# Patient Record
Sex: FEMALE | Race: BLACK OR AFRICAN AMERICAN | Employment: FULL TIME | URBAN - METROPOLITAN AREA
[De-identification: names, ages, dates, MRNs, and addresses within clinical notes are randomized per-mention and may not be internally consistent; named-entity substitution may affect disease eponyms.]

---

## 2020-08-11 ENCOUNTER — OFFICE VISIT (OUTPATIENT)
Dept: OBGYN CLINIC | Age: 40
End: 2020-08-11
Payer: COMMERCIAL

## 2020-08-11 VITALS
HEIGHT: 66 IN | BODY MASS INDEX: 36.54 KG/M2 | SYSTOLIC BLOOD PRESSURE: 132 MMHG | WEIGHT: 227.38 LBS | DIASTOLIC BLOOD PRESSURE: 94 MMHG

## 2020-08-11 DIAGNOSIS — Z01.419 ROUTINE GYNECOLOGICAL EXAMINATION: Primary | ICD-10-CM

## 2020-08-11 DIAGNOSIS — Z12.4 SCREENING FOR MALIGNANT NEOPLASM OF THE CERVIX: ICD-10-CM

## 2020-08-11 PROCEDURE — 99395 PREV VISIT EST AGE 18-39: CPT | Performed by: OBSTETRICS & GYNECOLOGY

## 2020-08-11 NOTE — PROGRESS NOTES
Adiel Sanabria is a 47 y.o. female who presents today for the following:  Chief Complaint   Patient presents with    Annual Exam     needs Pap smear        No Known Allergies    Current Outpatient Medications   Medication Sig    Diclofenac Potassium (CAMBIA) 50 mg pwpk Take 50 mg by mouth as needed.  topiramate (TOPAMAX) 50 mg tablet Take 50 mg by mouth two (2) times a day.  nortriptyline (PAMELOR) 25 mg capsule Take  by mouth nightly.  promethazine (PHENERGAN) 25 mg suppository Insert  into rectum every six (6) hours as needed for Nausea. No current facility-administered medications for this visit. Past Medical History:   Diagnosis Date    Headache        Past Surgical History:   Procedure Laterality Date    LAP,TUBAL CAUTERY  2011       Family History   Problem Relation Age of Onset   [facility] Migraines Mother     Migraines Sister        Social History     Socioeconomic History    Marital status: SINGLE     Spouse name: Not on file    Number of children: Not on file    Years of education: Not on file    Highest education level: Not on file   Occupational History    Not on file   Social Needs    Financial resource strain: Not on file    Food insecurity     Worry: Not on file     Inability: Not on file    Transportation needs     Medical: Not on file     Non-medical: Not on file   Tobacco Use    Smoking status: Current Every Day Smoker     Packs/day: 1.00    Smokeless tobacco: Never Used   Substance and Sexual Activity    Alcohol use:  Yes    Drug use: Never    Sexual activity: Yes   Lifestyle    Physical activity     Days per week: Not on file     Minutes per session: Not on file    Stress: Not on file   Relationships    Social connections     Talks on phone: Not on file     Gets together: Not on file     Attends Caodaism service: Not on file     Active member of club or organization: Not on file     Attends meetings of clubs or organizations: Not on file     Relationship status: Not on file    Intimate partner violence     Fear of current or ex partner: Not on file     Emotionally abused: Not on file     Physically abused: Not on file     Forced sexual activity: Not on file   Other Topics Concern    Not on file   Social History Narrative    Not on file         HPI  Here for annual exam.  Patient is doing well. ROS   Review of Systems   Constitutional: Negative. HENT: Negative. Eyes: Negative. Respiratory: Negative. Cardiovascular: Negative. Gastrointestinal: Negative. Genitourinary: Negative. Musculoskeletal: Negative. Skin: Negative. Neurological: Negative. Endo/Heme/Allergies: Negative. Psychiatric/Behavioral: Negative. BP (!) 132/94 (BP 1 Location: Left arm, BP Patient Position: Sitting)   Ht 5' 6\" (1.676 m)   Wt 227 lb 6 oz (103.1 kg)   LMP 07/09/2020   BMI 36.70 kg/m²    OBGyn Exam   Constitutional  · Appearance: well-nourished, well developed, alert, in no acute distress    HENT  · Head and Face: appears normal    Neck  · Inspection/Palpation: normal appearance, no masses or tenderness  · Lymph Nodes: no lymphadenopathy present  · Thyroid: gland size normal, nontender, no nodules or masses present on palpation    Breasts   Symmetric, no palpable masses, no tenderness, no skin changes, no nipple abnormality, no nipple discharge, no lymphadenopathy.     Chest  · Respiratory Effort: breathing labored  · Auscultation: normal breath sounds    Cardiovascular  · Heart:  · Auscultation: regular rate and rhythm without murmur    Gastrointestinal  · Abdominal Examination: abdomen non-tender to palpation, normal bowel sounds, no masses present  · Liver and spleen: no hepatomegaly present, spleen not palpable  · Hernias: no hernias identified    Genitourinary  · External Genitalia: normal appearance for age, no discharge present, no tenderness present, no inflammatory lesions present, no masses present, no atrophy present  · Vagina: normal vaginal vault without central or paravaginal defects, no discharge present, no inflammatory lesions present, no masses present  · Bladder: non-tender to palpation  · Urethra: appears normal  · Cervix: normal   · Uterus: normal size, shape and consistency  · Adnexa: no adnexal tenderness present, no adnexal masses present  · Perineum: perineum within normal limits, no evidence of trauma, no rashes or skin lesions present  · Anus: anus within normal limits, no hemorrhoids present  · Inguinal Lymph Nodes: no lymphadenopathy present    Skin  · General Inspection: no rash, no lesions identified    Neurologic/Psychiatric  · Mental Status:  · Orientation: grossly oriented to person, place and time  · Mood and Affect: mood normal, affect appropriate    No results found for this visit on 08/11/20. Orders Placed This Encounter    PAP IG, CT-NG TV [address] HPV [id number], [id number])     Order Specific Question:   Pap Source? Answer:   Endocervical     Order Specific Question:   Total Hysterectomy? Answer:   No     Order Specific Question:   Supracervical Hysterectomy? Answer:   No     Order Specific Question:   Post Menopausal?     Answer:   No     Order Specific Question:   Hormone Therapy? Answer:   No     Order Specific Question:   IUD? Answer:   No     Order Specific Question:   Abnormal Bleeding? Answer:   No     Order Specific Question:   Pregnant     Answer:   No     Order Specific Question:   Post Partum? Answer:   No         1. Routine gynecological examination  Reviewed Pap smear/HPV, mammogram, bone density colonoscopy testing guidelines. Encouraged healthy lifestyle. Discussed importanceof getting annual exams. Discussed the risk of osteoporosis and recommended 1200 to 1500 mg of calcium as well as vitamin D supplementation daily.   Recommended monthly self breast exams and instructed and demonstrated to the patient on the proper technique educated on the importance of healthy weight management and the significance of not smoking.     2. Screening for malignant neoplasm of the cervix    - PAP IG, CT-NG TV [address] HPV LGBF([id number], [id number])        Follow-up and Dispositions    · Return in about 1 year (around 8/11/2021) for Annual Exam.

## 2020-08-15 LAB
C TRACH RRNA CVX QL NAA+PROBE: NEGATIVE
CYTOLOGIST CVX/VAG CYTO: NORMAL
CYTOLOGY CVX/VAG DOC CYTO: NORMAL
CYTOLOGY CVX/VAG DOC THIN PREP: NORMAL
DX ICD CODE: NORMAL
LABCORP, 190119: NORMAL
Lab: NORMAL
N GONORRHOEA RRNA CVX QL NAA+PROBE: NEGATIVE
OTHER STN SPEC: NORMAL
STAT OF ADQ CVX/VAG CYTO-IMP: NORMAL
T VAGINALIS RRNA SPEC QL NAA+PROBE: NEGATIVE

## 2020-08-20 ENCOUNTER — TELEPHONE (OUTPATIENT)
Dept: OBGYN CLINIC | Age: 40
End: 2020-08-20

## 2020-08-20 NOTE — TELEPHONE ENCOUNTER
----- Message from Brook Jimenez MD sent at 8/17/2020  1:32 PM EDT -----  Please notify patient about normal results.   Thank you

## 2020-09-29 ENCOUNTER — OFFICE VISIT (OUTPATIENT)
Dept: NEUROLOGY | Age: 40
End: 2020-09-29
Payer: COMMERCIAL

## 2020-09-29 VITALS
BODY MASS INDEX: 36.98 KG/M2 | DIASTOLIC BLOOD PRESSURE: 68 MMHG | RESPIRATION RATE: 18 BRPM | WEIGHT: 230.1 LBS | SYSTOLIC BLOOD PRESSURE: 114 MMHG | HEIGHT: 66 IN | OXYGEN SATURATION: 98 % | HEART RATE: 76 BPM | TEMPERATURE: 97 F

## 2020-09-29 DIAGNOSIS — G43.009 MIGRAINE WITHOUT AURA AND WITHOUT STATUS MIGRAINOSUS, NOT INTRACTABLE: Primary | ICD-10-CM

## 2020-09-29 DIAGNOSIS — E66.01 SEVERE OBESITY (HCC): ICD-10-CM

## 2020-09-29 DIAGNOSIS — R06.83 HABITUAL SNORING: ICD-10-CM

## 2020-09-29 PROCEDURE — 99204 OFFICE O/P NEW MOD 45 MIN: CPT | Performed by: SPECIALIST

## 2020-09-29 RX ORDER — RIZATRIPTAN BENZOATE 10 MG/1
10 TABLET ORAL
Qty: 9 TAB | Refills: 3 | Status: SHIPPED | OUTPATIENT
Start: 2020-09-29 | End: 2020-09-29

## 2020-09-29 RX ORDER — ERENUMAB-AOOE 70 MG/ML
70 INJECTION SUBCUTANEOUS ONCE
Qty: 1 EACH | Refills: 5 | Status: SHIPPED | OUTPATIENT
Start: 2020-09-29 | End: 2020-09-29

## 2020-09-29 NOTE — PROGRESS NOTES
Neurology Consult      Subjective:      Jennifer Phelan is a 47 y.o. female who comes in today with the following headache history. Says she was diagnosed with migraines in 2011. Gets 20 out of 31 headache days per month. These are bad headaches when they begin on awakening. They can be right occipital to frontal and with her monthly cycles they are strictly occipital base. As soon as her cycle start her headache stops. Headaches have a throbbing character with nausea and occasional vomiting. Enhanced by aerosol plug-in's and caffeine. Diminished by applying some pressure on her neck and a heating pad on the back of her head and a call wrap on the front. Sleeping is good although her  says she habitually snores. Denies any stress per se. She says she needs to develop a regular exercise program.  Previous information includes seeing my partner(Shu) in October 2016. At that time was getting 2 headaches a week intense and it seemed to other neurologist.  Found that Percocet and Motrin helped but Imitrex did not. Topamax would induce paresthesia and taste changes and nortriptyline she had stopped because of failure. Had a previous brain MRI reported normal and obesity was in the picture and her headaches had a positive monthly cycle association. Current Outpatient Medications   Medication Sig Dispense Refill    butalb/acetaminophen/caffeine (FIORICET PO) Take  by mouth.  erenumab-aooe (Aimovig Autoinjector) 70 mg/mL injection 1 mL by SubCUTAneous route once for 1 dose. 1 Each 5    Diclofenac Potassium (CAMBIA) 50 mg pwpk Take 50 mg by mouth as needed. 1 Packet 5    topiramate (TOPAMAX) 50 mg tablet Take 50 mg by mouth two (2) times a day. 1    nortriptyline (PAMELOR) 25 mg capsule Take  by mouth nightly.  promethazine (PHENERGAN) 25 mg suppository Insert  into rectum every six (6) hours as needed for Nausea.         No Known Allergies  Past Medical History:   Diagnosis Date  Headache       Past Surgical History:   Procedure Laterality Date    HX GYN      LAP,TUBAL CAUTERY        Social History     Socioeconomic History    Marital status: SINGLE     Spouse name: Not on file    Number of children: Not on file    Years of education: Not on file    Highest education level: Not on file   Occupational History    Not on file   Social Needs    Financial resource strain: Not on file    Food insecurity     Worry: Not on file     Inability: Not on file    Transportation needs     Medical: Not on file     Non-medical: Not on file   Tobacco Use    Smoking status: Former Smoker     Packs/day: 1.00     Last attempt to quit: 2019     Years since quittin.7    Smokeless tobacco: Never Used   Substance and Sexual Activity    Alcohol use: Yes    Drug use: Never    Sexual activity: Yes   Lifestyle    Physical activity     Days per week: Not on file     Minutes per session: Not on file    Stress: Not on file   Relationships    Social connections     Talks on phone: Not on file     Gets together: Not on file     Attends Latter day service: Not on file     Active member of club or organization: Not on file     Attends meetings of clubs or organizations: Not on file     Relationship status: Not on file    Intimate partner violence     Fear of current or ex partner: Not on file     Emotionally abused: Not on file     Physically abused: Not on file     Forced sexual activity: Not on file   Other Topics Concern    Not on file   Social History Narrative    Not on file      Family History   Problem Relation Age of Onset    Migraines Mother     Migraines Sister       Visit Vitals  /68   Pulse 76   Temp 97 °F (36.1 °C)   Resp 18   Ht 5' 6\" (1.676 m)   Wt 104.4 kg (230 lb 1.6 oz)   LMP 2020 (Exact Date)   SpO2 98%   BMI 37.14 kg/m²        Review of Systems:   A comprehensive review of systems was negative except for that written in the HPI.     Neuro Exam:     Appearance: The patient is well developed, well nourished, provides a coherent history and is in no acute distress. Mental Status: Oriented to time, place and person. Mood and affect appropriate. Cranial Nerves:   Intact visual fields. Fundi are benign. SIVA, EOM's full, no nystagmus, no ptosis. Facial sensation is normal. Corneal reflexes are intact. Facial movement is symmetric. Hearing is normal bilaterally. Palate is midline with normal sternocleidomastoid and trapezius muscles are normal. Tongue is midline. Motor:  5/5 strength in upper and lower proximal and distal muscles. Normal bulk and tone. No fasciculations. Reflexes:   Deep tendon reflexes 2+/4 and symmetrical.   Sensory:   Normal to touch, pinprick and vibration. Gait:  Normal gait. Tremor:   No tremor noted. Cerebellar:  No cerebellar signs present. Neurovascular:  Normal heart sounds and regular rhythm, peripheral pulses intact, and no carotid bruits. Assessment:   Migraine headaches. Given the patient's characteristics will suggest Lewkeith Nanas as a preventative and Maxalt as a rescue which can be used at moment 0 with over-the-counter remedies. Will refer to sleep medicine for concerns of obstructive sleep apnea. Get good sleep minimize stress and regular exercise could help. Continue to monitor headaches to increase self-awareness. Plan:   Revisit 6 weeks.   Signed by :  Irineo Mirza MD

## 2020-09-29 NOTE — PATIENT INSTRUCTIONS
[address] [facility]   Statement to Patients  April 1, 2014      In an effort to ensure the large volume of patient prescription refills is processed in the most efficient and expeditious manner, we are asking our patients to assist us by calling your Pharmacy for all prescription refills, this will include also your  Mail Order Pharmacy. The pharmacy will contact our office electronically to continue the refill process. Please do not wait until the last minute to call your pharmacy. We need at least 48 hours (2days) to fill prescriptions. We also encourage you to call your pharmacy before going to  your prescription to make sure it is ready. With regard to controlled substance prescription refill requests (narcotic refills) that need to be picked up at our office, we ask your cooperation by providing us with at least 72 hours (3days) notice that you will need a refill. We will not refill narcotic prescription refill requests after 4:00pm on any weekday, Monday through Thursday, or after 2:00pm on Fridays, or on the weekends. We encourage everyone to explore another way of getting your prescription refill request processed using [employer], our patient web portal through our electronic medical record system. [employer] is an efficient and effective way to communicate your medication request directly to the office and  downloadable as an ford on your smart phone . [employer] also features a review functionality that allows you to view your medication list as well as leave messages for your physician. Are you ready to get connected? If so please review the attatched instructions or speak to any of our staff to get you set up right away! Thank you so much for your cooperation. Should you have any questions please contact our Practice Administrator. The Physicians and Staff,  [facility]   Patient history reviewed patient examined.   With the patient's headache descriptors will suggest a preventative drug with Aimovig and a rescue drug with Maxalt. The Maxalt can be taken at moment 0 with over-the-counter Aleve or ibuprofen for potentially a better combination effect. Does have a regular snoring issue so will refer to sleep medicine for rule out obstructive sleep apnea. Good good sleep minimize stress and a regular exercise routine such as walking could help. Continue to monitor headaches to increase self-awareness.

## 2020-11-19 ENCOUNTER — OFFICE VISIT (OUTPATIENT)
Dept: NEUROLOGY | Age: 40
End: 2020-11-19
Payer: COMMERCIAL

## 2020-11-19 VITALS
HEIGHT: 66 IN | OXYGEN SATURATION: 97 % | SYSTOLIC BLOOD PRESSURE: 134 MMHG | BODY MASS INDEX: 37.14 KG/M2 | DIASTOLIC BLOOD PRESSURE: 82 MMHG | HEART RATE: 71 BPM | RESPIRATION RATE: 18 BRPM

## 2020-11-19 DIAGNOSIS — G43.009 MIGRAINE WITHOUT AURA AND WITHOUT STATUS MIGRAINOSUS, NOT INTRACTABLE: Primary | ICD-10-CM

## 2020-11-19 PROCEDURE — 99213 OFFICE O/P EST LOW 20 MIN: CPT | Performed by: SPECIALIST

## 2020-11-19 NOTE — PATIENT INSTRUCTIONS
Patient history reviewed patient examined. Very happy the patient is done so well with headache control and I hope it continues. Did not need refills on the Aimovig or the Maxalt today. I did suggest on the perimenstrual headaches that magnesium and riboflavin supplements that can be purchased in the pharmacy may help tamper those headaches down. Good good sleep minimize stress and exercise could be helpful as well. Revisit 6 months.

## 2020-11-19 NOTE — PROGRESS NOTES
Neurology Consult      Subjective:      Hilary Rousseau is a 47 y.o. female who comes in today with migraine headache history. Wears a big smile and says she cannot believe her headache control at this point. Got 6 headaches last month and this month only 3. Is on Aimovig 70 mg and the Maxalt as a rescue remedy. Gives herself her own injections and has had no difficulties. Did reference some headaches that she can normally count on around her cycle. My suggestion was to check out the pharmacy and could get over-the-counter magnesium and riboflavin supplements that may help with those headache types. She references no new problems and as if the headache success was not enough there is no downside to taking the Aimovig. Simple advice would be to get good sleep minimize stress and a regular exercise program could help as well. Revisit 6 months. Current Outpatient Medications   Medication Sig Dispense Refill    erenumab-aooe (AIMOVIG AUTOINJECTOR SC) by SubCUTAneous route.  butalb/acetaminophen/caffeine (FIORICET PO) Take  by mouth.  Diclofenac Potassium (CAMBIA) 50 mg pwpk Take 50 mg by mouth as needed. 1 Packet 5    topiramate (TOPAMAX) 50 mg tablet Take 50 mg by mouth two (2) times a day. 1    nortriptyline (PAMELOR) 25 mg capsule Take  by mouth nightly.  promethazine (PHENERGAN) 25 mg suppository Insert  into rectum every six (6) hours as needed for Nausea.         No Known Allergies  Past Medical History:   Diagnosis Date    Headache       Past Surgical History:   Procedure Laterality Date    HX GYN      LAP,TUBAL CAUTERY  2011      Social History     Socioeconomic History    Marital status: SINGLE     Spouse name: Not on file    Number of children: Not on file    Years of education: Not on file    Highest education level: Not on file   Occupational History    Not on file   Social Needs    Financial resource strain: Not on file    Food insecurity     Worry: Not on file Inability: Not on file    Transportation needs     Medical: Not on file     Non-medical: Not on file   Tobacco Use    Smoking status: Former Smoker     Packs/day: 1.00     Last attempt to quit: 2019     Years since quittin.8    Smokeless tobacco: Never Used   Substance and Sexual Activity    Alcohol use: Yes    Drug use: Never    Sexual activity: Yes   Lifestyle    Physical activity     Days per week: Not on file     Minutes per session: Not on file    Stress: Not on file   Relationships    Social connections     Talks on phone: Not on file     Gets together: Not on file     Attends Lutheran service: Not on file     Active member of club or organization: Not on file     Attends meetings of clubs or organizations: Not on file     Relationship status: Not on file    Intimate partner violence     Fear of current or ex partner: Not on file     Emotionally abused: Not on file     Physically abused: Not on file     Forced sexual activity: Not on file   Other Topics Concern    Not on file   Social History Narrative    Not on file      Family History   Problem Relation Age of Onset    Migraines Mother     Migraines Sister       Visit Vitals  /82   Pulse 71   Resp 18   Ht 5' 6\" (1.676 m)   LMP 10/17/2020 (Exact Date)   SpO2 97%   BMI 37.14 kg/m²        Review of Systems:   A comprehensive review of systems was negative except for that written in the HPI. Neuro Exam:     Appearance: The patient is well developed, well nourished, provides a coherent history and is in no acute distress. Mental Status: Oriented to time, place and person. Mood and affect appropriate. Cranial Nerves:   Intact visual fields. Fundi are benign. SIVA, EOM's full, no nystagmus, no ptosis. Facial sensation is normal. Corneal reflexes are intact. Facial movement is symmetric. Hearing is normal bilaterally. Palate is midline with normal sternocleidomastoid and trapezius muscles are normal. Tongue is midline.    Motor: 5/5 strength in upper and lower proximal and distal muscles. Normal bulk and tone. No fasciculations. Reflexes:   Deep tendon reflexes 2+/4 and symmetrical.   Sensory:   Normal to touch, pinprick and vibration. Gait:  Normal gait. Tremor:   No tremor noted. Cerebellar:  No cerebellar signs present. Neurovascular:  Normal heart sounds and regular rhythm, peripheral pulses intact, and no carotid bruits. Assessment:   Migraine headaches. Very happy for patient and hope the success continues of course. Did reference magnesium and riboflavin supplements which can be purchased over-the-counter in pharmacy and perhaps that enhances her headache control even more around her monthly cycles. Get good sleep minimize stress and exercise could help. Plan:   Revisit 6 months.   Signed by :  Juan Diego Butler MD

## 2021-01-12 ENCOUNTER — TELEPHONE (OUTPATIENT)
Dept: NEUROLOGY | Age: 41
End: 2021-01-12

## 2021-01-12 NOTE — TELEPHONE ENCOUNTER
----- Message from Rochele Cabot sent at 1/12/2021 11:37 AM EST -----  Regarding: UNIQUE/TELEPHONE  Contact: [phone]  General Message/Vendor Calls    Caller's first and last name: Shadia Hartman      Reason for call: Prior auth needed for Aimovig      Callback required yes/no and why: Yes      Best contact number(s): [phone]      Details to clarify the request: Per patient a prior Gaviota Couch is needed for Aimovig, Patient stated she has been out of the injection for 3 days. Please call prior auth in at [phone].       Rochele Cabot

## 2021-01-13 NOTE — TELEPHONE ENCOUNTER
----- Message from Panda Marsh sent at 1/13/2021 12:49 PM EST -----  Regarding: Dr. Adeola Alberto: [phone]  General Message/Vendor Calls    Caller's first and last name: Patient      Reason for call: Patient's insurance, [employer],  needs a prior authorization form ([phone]) filled out and resent to her insurance so her pharmacy, [phone], can refill the \"amovic shot\" medication. Callback required yes/no and why: Yes, confirm prior authorization form is sent.        Best contact number(s):[phone]      Details to clarify the request:n/a      Panda Marsh

## 2021-01-29 DIAGNOSIS — B00.9 HSV (HERPES SIMPLEX VIRUS) INFECTION: Primary | ICD-10-CM

## 2021-01-29 RX ORDER — VALACYCLOVIR HYDROCHLORIDE 500 MG/1
500 TABLET, FILM COATED ORAL 2 TIMES DAILY
Qty: 10 TAB | Refills: 1 | Status: SHIPPED | OUTPATIENT
Start: 2021-01-29 | End: 2021-03-04 | Stop reason: SDUPTHER

## 2021-03-04 ENCOUNTER — TELEPHONE (OUTPATIENT)
Dept: OBGYN CLINIC | Age: 41
End: 2021-03-04

## 2021-03-04 DIAGNOSIS — B00.9 HSV (HERPES SIMPLEX VIRUS) INFECTION: ICD-10-CM

## 2021-03-04 RX ORDER — VALACYCLOVIR HYDROCHLORIDE 500 MG/1
500 TABLET, FILM COATED ORAL 2 TIMES DAILY
Qty: 10 TAB | Refills: 1 | Status: SHIPPED | OUTPATIENT
Start: 2021-03-04 | End: 2022-06-15 | Stop reason: SDUPTHER

## 2021-03-07 ENCOUNTER — HOSPITAL ENCOUNTER (EMERGENCY)
Age: 41
Discharge: HOME | End: 2021-03-07
Attending: FAMILY MEDICINE
Payer: COMMERCIAL

## 2021-03-07 VITALS
RESPIRATION RATE: 16 BRPM | WEIGHT: 230 LBS | BODY MASS INDEX: 36.96 KG/M2 | HEIGHT: 66 IN | DIASTOLIC BLOOD PRESSURE: 99 MMHG | SYSTOLIC BLOOD PRESSURE: 143 MMHG | HEART RATE: 76 BPM | TEMPERATURE: 98.4 F | OXYGEN SATURATION: 99 %

## 2021-03-07 DIAGNOSIS — B34.9 VIRAL ILLNESS: Primary | ICD-10-CM

## 2021-03-07 LAB
FLUAV AG NPH QL IA: NEGATIVE
FLUBV AG NOSE QL IA: NEGATIVE

## 2021-03-07 PROCEDURE — 99282 EMERGENCY DEPT VISIT SF MDM: CPT

## 2021-03-07 PROCEDURE — 87804 INFLUENZA ASSAY W/OPTIC: CPT

## 2021-03-07 NOTE — ED PROVIDER NOTES
EMERGENCY DEPARTMENT HISTORY AND PHYSICAL EXAM      Date: 3/7/2021  Patient Name: Akshat Lozano    History of Presenting Illness     Chief Complaint   Patient presents with    Chills    Generalized Body Aches       History Provided By: Patient    HPI: Akshat Lozano, 46 y.o. female with a past medical history as documented below, presents to the ED with cc of body aches and chills. It began this morning. She took her temperature and it was 100.5 °F.  She took the Tylenol rechecked her temperature and our and it decreased to 98. She has a burning sensation on the right upper lip. She states when she the symptoms she usually break out in a herpes outbreak. She is on Valtrex and takes it daily. No nausea, vomiting, headache, earache, sore throat, neck pain, skin rash, numbness and tingling in the upper and lower extremities, weakness, chest pain, coughing, dyspnea, and all other symptoms are negative. There are no other complaints, changes, or physical findings at this time. PCP: None    No current facility-administered medications on file prior to encounter. Current Outpatient Medications on File Prior to Encounter   Medication Sig Dispense Refill    valACYclovir (VALTREX) 500 mg tablet Take 1 Tab by mouth two (2) times a day. Indications: genital herpes 10 Tab 1    erenumab-aooe (AIMOVIG AUTOINJECTOR SC) by SubCUTAneous route.  butalb/acetaminophen/caffeine (FIORICET PO) Take  by mouth.  Diclofenac Potassium (CAMBIA) 50 mg pwpk Take 50 mg by mouth as needed. 1 Packet 5    topiramate (TOPAMAX) 50 mg tablet Take 50 mg by mouth two (2) times a day. 1    nortriptyline (PAMELOR) 25 mg capsule Take  by mouth nightly.  promethazine (PHENERGAN) 25 mg suppository Insert  into rectum every six (6) hours as needed for Nausea.          Past History     Past Medical History:  Past Medical History:   Diagnosis Date    Headache     HSV-1 infection        Past Surgical History:  Past Surgical History:   Procedure Laterality Date    HX GYN      [place] LAP,TUBAL CAUTERY         Family History:  Family History   Problem Relation Age of Onset   24 [facility] Migraines Mother    24 [facility] Migraines Sister        Social History:  Social History     Tobacco Use    Smoking status: Former Smoker     Packs/day: 1.00     Quit date:      Years since quittin.1    Smokeless tobacco: Never Used   Substance Use Topics    Alcohol use: Yes    Drug use: Never       Allergies:  No Known Allergies      Review of Systems     Review of Systems   Constitutional: Positive for chills. Negative for fever. Body aches   HENT: Negative for congestion and sore throat. Eyes: Negative for photophobia and visual disturbance. Respiratory: Negative for cough and shortness of breath. Cardiovascular: Negative for chest pain and palpitations. Gastrointestinal: Negative for nausea and vomiting. Genitourinary: Negative for dysuria and flank pain. Musculoskeletal: Negative for arthralgias, back pain and myalgias. Skin: Negative for rash and wound. Allergic/Immunologic: Negative for environmental allergies and food allergies. Neurological: Negative for light-headedness and headaches. All other systems reviewed and are negative. Physical Exam     Physical Exam  Vitals signs and nursing note reviewed. Constitutional:       Appearance: Normal appearance. She is normal weight. HENT:      Head: Normocephalic and atraumatic. Right Ear: Tympanic membrane, ear canal and external ear normal.      Left Ear: Tympanic membrane, ear canal and external ear normal.      Nose: Nose normal. No congestion or rhinorrhea. Mouth/Throat:      Mouth: Mucous membranes are moist.      Pharynx: Oropharynx is clear. No oropharyngeal exudate or posterior oropharyngeal erythema. Eyes:      Extraocular Movements: Extraocular movements intact.       Conjunctiva/sclera: Conjunctivae normal.   Neck:      Musculoskeletal: Normal range of motion and neck supple. No neck rigidity or muscular tenderness. Cardiovascular:      Rate and Rhythm: Normal rate and regular rhythm. Pulses: Normal pulses. Heart sounds: Normal heart sounds. Pulmonary:      Effort: Pulmonary effort is normal.      Breath sounds: Normal breath sounds. Skin:     General: Skin is warm. Capillary Refill: Capillary refill takes less than 2 seconds. Neurological:      General: No focal deficit present. Mental Status: She is alert and oriented to person, place, and time. Psychiatric:         Mood and Affect: Mood normal.         Behavior: Behavior normal.         Thought Content: Thought content normal.         Judgment: Judgment normal.         Lab and Diagnostic Study Results     Labs -     Recent Results (from the past 12 hour(s))   INFLUENZA A & B AG (RAPID TEST)    Collection Time: 03/07/21  5:15 PM   Result Value Ref Range    Influenza A Antigen Negative Negative      Influenza B Antigen Negative Negative         Radiologic Studies -   @lastxrresult@  CT Results  (Last 48 hours)    None        CXR Results  (Last 48 hours)    None            Medical Decision Making   - I am the first provider for this patient. - I reviewed the vital signs, available nursing notes, past medical history, past surgical history, family history and social history. - Initial assessment performed. The patients presenting problems have been discussed, and they are in agreement with the care plan formulated and outlined with them. I have encouraged them to ask questions as they arise throughout their visit. Vital Signs-Reviewed the patient's vital signs.   Patient Vitals for the past 12 hrs:   Temp Pulse Resp BP SpO2   03/07/21 1704 98.4 °F (36.9 °C) 76 16 (!) 143/99 99 %       Records Reviewed: Nursing Notes    ED Course:          Provider Notes (Medical Decision Making):     MDM  Number of Diagnoses or Management Options  Viral illness  Diagnosis management comments: 6:30 PM  Patient is stable with no marked toxicity or distress. No significant findings on physical exam. Results reviewed with the patient. All questions were answered and there are no apparent barriers to comprehension or communication. The patient verbalizes agreement with the diagnosis, treatment plan, and understanding of the follow-up instructions. The patient is appropriate for discharge; leaves the Emergency Department walking with a stable gait. Patient understands to return to the ED in 12-24 hours for any new or worsening symptoms or no  expected timely resolution of symptoms on mediations prescribed. Disposition   Disposition: Condition stable  DC- Adult Discharges: All of the diagnostic tests were reviewed and questions answered. Diagnosis, care plan and treatment options were discussed. The patient understands the instructions and will follow up as directed. The patients results have been reviewed with them. They have been counseled regarding their diagnosis. The patient verbally convey understanding and agreement of the signs, symptoms, diagnosis, treatment and prognosis and additionally agrees to follow up as recommended with their PCP in 24 - 48 hours. They also agree with the care-plan and convey that all of their questions have been answered. I have also put together some discharge instructions for them that include: 1) educational information regarding their diagnosis, 2) how to care for their diagnosis at home, as well a 3) list of reasons why they would want to return to the ED prior to their follow-up appointment, should their condition change. Discharged    DISCHARGE PLAN:  1. Current Discharge Medication List      CONTINUE these medications which have NOT CHANGED    Details   valACYclovir (VALTREX) 500 mg tablet Take 1 Tab by mouth two (2) times a day.  Indications: genital herpes  Qty: 10 Tab, Refills: 1    Associated Diagnoses: HSV (herpes simplex virus) infection erenumab-aooe (AIMOVIG AUTOINJECTOR SC) by SubCUTAneous route. butalb/acetaminophen/caffeine (FIORICET PO) Take  by mouth. Diclofenac Potassium (CAMBIA) 50 mg pwpk Take 50 mg by mouth as needed. Qty: 1 Packet, Refills: 5      topiramate (TOPAMAX) 50 mg tablet Take 50 mg by mouth two (2) times a day. Refills: 1      nortriptyline (PAMELOR) 25 mg capsule Take  by mouth nightly. promethazine (PHENERGAN) 25 mg suppository Insert  into rectum every six (6) hours as needed for Nausea. 2.   Follow-up Information     Follow up With Specialties Details [facility]  Schedule an appointment as soon as possible for a visit in 3 days  [address] [place] [address]  [phone]        3. Return to ED if worse   4. Current Discharge Medication List            Diagnosis     Clinical Impression:   1. Viral illness        Attestations:    Faiza Chase, DO    Please note that this dictation was completed with [employer], the computer voice recognition software. Quite often unanticipated grammatical, syntax, homophones, and other interpretive errors are inadvertently transcribed by the computer software. Please disregard these errors. Please excuse any errors that have escaped final proofreading. Thank you.

## 2021-03-07 NOTE — DISCHARGE INSTRUCTIONS
Thank you! Thank you for allowing me to care for you in the emergency department. I sincerely hope that you are satisfied with your visit today. It is my goal to provide you with excellent care. Below you will find a list of your labs and imaging from your visit today. Should you have any questions regarding these results please do not hesitate to call the emergency department. Labs -     Recent Results (from the past 12 hour(s))   INFLUENZA A & B AG (RAPID TEST)    Collection Time: 03/07/21  5:15 PM   Result Value Ref Range    Influenza A Antigen Negative Negative      Influenza B Antigen Negative Negative         Radiologic Studies -   No orders to display     CT Results  (Last 48 hours)      None          CXR Results  (Last 48 hours)      None               If you feel that you have not received excellent quality care or timely care, please ask to speak to the nurse manager. Please choose us in the future for your continued health care needs. ------------------------------------------------------------------------------------------------------------  The exam and treatment you received in the Emergency Department were for an urgent problem and are not intended as complete care. It is important that you follow-up with a doctor, nurse practitioner, or physician assistant to:  (1) confirm your diagnosis,  (2) re-evaluation of changes in your illness and treatment, and  (3) for ongoing care. If your symptoms become worse or you do not improve as expected and you are unable to reach your usual health care provider, you should return to the Emergency Department. We are available 24 hours a day. Please take your discharge instructions with you when you go to your follow-up appointment. If you have any problem arranging a follow-up appointment, contact the Emergency Department immediately.     If a prescription has been provided, please have it filled as soon as possible to prevent a delay in treatment. Read the entire medication instruction sheet provided to you by the pharmacy. If you have any questions or reservations about taking the medication due to side effects or interactions with other medications, please call your primary care physician or contact the ER to speak with the charge nurse. Make an appointment with your family doctor or the physician you were referred to for follow-up of this visit as instructed on your discharge paperwork, as this is a mandatory follow-up. Return to the ER if you are unable to be seen or if you are unable to be seen in a timely manner. If you have any problem arranging the follow-up visit, contact the Emergency Department immediately.

## 2021-03-07 NOTE — ED TRIAGE NOTES
Patient reports that she has been feeling run down and tired for past two days states \"I think It may be related to my herpes I feel like I am getting an outbreak around my lips\"

## 2021-03-15 ENCOUNTER — OFFICE VISIT (OUTPATIENT)
Dept: OBGYN CLINIC | Age: 41
End: 2021-03-15
Payer: COMMERCIAL

## 2021-03-15 VITALS
OXYGEN SATURATION: 100 % | HEIGHT: 66 IN | BODY MASS INDEX: 36.48 KG/M2 | DIASTOLIC BLOOD PRESSURE: 81 MMHG | SYSTOLIC BLOOD PRESSURE: 137 MMHG | HEART RATE: 86 BPM | TEMPERATURE: 97.1 F | WEIGHT: 227 LBS

## 2021-03-15 DIAGNOSIS — A60.00 RECURRENT GENITAL HERPES: Primary | ICD-10-CM

## 2021-03-15 PROCEDURE — 99213 OFFICE O/P EST LOW 20 MIN: CPT | Performed by: OBSTETRICS & GYNECOLOGY

## 2021-03-15 RX ORDER — VALACYCLOVIR HYDROCHLORIDE 500 MG/1
500 TABLET, FILM COATED ORAL DAILY
Qty: 90 TAB | Refills: 3 | Status: SHIPPED | OUTPATIENT
Start: 2021-03-15 | End: 2022-01-30

## 2021-03-15 NOTE — PROGRESS NOTES
Rima Samson is a 46 y.o. female who presents today for the following:  Chief Complaint   Patient presents with    Check Up     c/o recurrent HSV outbreaks        No Known Allergies    Current Outpatient Medications   Medication Sig    valACYclovir (VALTREX) 500 mg tablet Take 1 Tab by mouth daily for 360 days.  valACYclovir (VALTREX) 500 mg tablet Take 1 Tab by mouth two (2) times a day. Indications: genital herpes    erenumab-aooe (AIMOVIG AUTOINJECTOR SC) by SubCUTAneous route.  butalb/acetaminophen/caffeine (FIORICET PO) Take  by mouth.  Diclofenac Potassium (CAMBIA) 50 mg pwpk Take 50 mg by mouth as needed.  topiramate (TOPAMAX) 50 mg tablet Take 50 mg by mouth two (2) times a day.  nortriptyline (PAMELOR) 25 mg capsule Take  by mouth nightly.  promethazine (PHENERGAN) 25 mg suppository Insert  into rectum every six (6) hours as needed for Nausea. No current facility-administered medications for this visit. Past Medical History:   Diagnosis Date    Headache     HSV-1 infection        Past Surgical History:   Procedure Laterality Date    HX GYN      [place] LAP,TUBAL CAUTERY         Family History   Problem Relation Age of Onset   Ravinder Loser Migraines Mother     Migraines Sister        Social History     Socioeconomic History    Marital status:      Spouse name: Not on file    Number of children: Not on file    Years of education: Not on file    Highest education level: Not on file   Occupational History    Not on file   Social Needs    Financial resource strain: Not on file    Food insecurity     Worry: Not on file     Inability: Not on file    Transportation needs     Medical: Not on file     Non-medical: Not on file   Tobacco Use    Smoking status: Former Smoker     Packs/day: 1.00     Quit date:      Years since quittin.2    Smokeless tobacco: Never Used   Substance and Sexual Activity    Alcohol use:  Yes    Drug use: Never    Sexual activity: Yes Lifestyle    Physical activity     Days per week: Not on file     Minutes per session: Not on file    Stress: Not on file   Relationships    Social connections     Talks on phone: Not on file     Gets together: Not on file     Attends Jainism service: Not on file     Active member of club or organization: Not on file     Attends meetings of clubs or organizations: Not on file     Relationship status: Not on file    Intimate partner violence     Fear of current or ex partner: Not on file     Emotionally abused: Not on file     Physically abused: Not on file     Forced sexual activity: Not on file   Other Topics Concern    Not on file   Social History Narrative    Not on file         HPI  46years old patient with history of genital herpes who presented today with complaints of frequent outbreaks. She states having more than 3 outbreaks in 1 year. ROS   Review of Systems   Constitutional: Negative. HENT: Negative. Eyes: Negative. Respiratory: Negative. Cardiovascular: Negative. Gastrointestinal: Negative. Genitourinary: Negative. Musculoskeletal: Negative. Skin: Negative. Neurological: Negative. Endo/Heme/Allergies: Negative. Psychiatric/Behavioral: Negative.       /81 (BP 1 Location: Left upper arm, BP Patient Position: Sitting, BP Cuff Size: Adult)   Pulse 86   Temp 97.1 °F (36.2 °C) (Temporal)   Ht 5' 6\" (1.676 m)   Wt 227 lb (103 kg)   LMP 02/22/2021   SpO2 100%   BMI 36.64 kg/m²    OBGyn Exam   Constitutional  · Appearance: well-nourished, well developed, alert, in no acute distress    HENT  · Head and Face: appears normal    Neck  · Inspection/Palpation: normal appearance, no masses or tenderness  · Lymph Nodes: no lymphadenopathy present  · Thyroid: gland size normal, nontender, no nodules or masses present on palpation    Chest  · Respiratory Effort: breathing labored  · Auscultation: normal breath sounds    Cardiovascular  · Heart:  · Auscultation: regular rate and rhythm without murmur    Gastrointestinal  · Abdominal Examination: abdomen non-tender to palpation, normal bowel sounds, no masses present  · Liver and spleen: no hepatomegaly present, spleen not palpable  · Hernias: no hernias identified    Genitourinary  · External Genitalia: normal appearance for age, no discharge present, no tenderness present, no inflammatory lesions present, no masses present, no atrophy present    Skin  · General Inspection: no rash, no lesions identified    Neurologic/Psychiatric  · Mental Status:  · Orientation: grossly oriented to person, place and time  · Mood and Affect: mood normal, affect appropriate    No results found for this visit on 03/15/21. Orders Placed This Encounter    valACYclovir (VALTREX) 500 mg tablet     Sig: Take 1 Tab by mouth daily for 360 days. Dispense:  90 Tab     Refill:  3         1. Recurrent genital herpes    - valACYclovir (VALTREX) 500 mg tablet; Take 1 Tab by mouth daily for 360 days. Dispense: 90 Tab; Refill: 3        Follow-up and Dispositions    · Return if symptoms worsen or fail to improve.

## 2021-06-07 ENCOUNTER — OFFICE VISIT (OUTPATIENT)
Dept: NEUROLOGY | Age: 41
End: 2021-06-07
Payer: COMMERCIAL

## 2021-06-07 VITALS
SYSTOLIC BLOOD PRESSURE: 110 MMHG | RESPIRATION RATE: 18 BRPM | DIASTOLIC BLOOD PRESSURE: 80 MMHG | BODY MASS INDEX: 36.48 KG/M2 | WEIGHT: 227 LBS | HEART RATE: 73 BPM | OXYGEN SATURATION: 99 % | HEIGHT: 66 IN

## 2021-06-07 DIAGNOSIS — G43.009 MIGRAINE WITHOUT AURA AND WITHOUT STATUS MIGRAINOSUS, NOT INTRACTABLE: Primary | ICD-10-CM

## 2021-06-07 PROCEDURE — 99213 OFFICE O/P EST LOW 20 MIN: CPT | Performed by: SPECIALIST

## 2021-06-07 NOTE — PROGRESS NOTES
Neurology Consult      Subjective:      Deshawn Torrez is a 46 y.o. female who comes in today with established migraines. Is a big fan of the Aimovig at 70 mg. Told her to keep track of things and if there is any trending of increasing number intensity and duration of headaches we could increase to 140 mg. No downside except for occasional constipation that she modifies with dietary discretion. 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 Did not find the magnesium all that helpful in terms of supplement with perimenstrual headaches. I reminded her that she should take it with vitamin B2 or riboflavin and give it 1 more chance. Did not reference any new medical or surgical history and urged her to monitor her headaches to increase her self-awareness and better enable her self to success rather than failure. Revisit 2 months. Current Outpatient Medications   Medication Sig Dispense Refill    erenumab-aooe (Aimovig Autoinjector) 70 mg/mL injection 1 mL by SubCUTAneous route every thirty (30) days. Indications: migraine prevention 3 mL 1    valACYclovir (VALTREX) 500 mg tablet Take 1 Tab by mouth daily for 360 days. 90 Tab 3    valACYclovir (VALTREX) 500 mg tablet Take 1 Tab by mouth two (2) times a day. Indications: genital herpes 10 Tab 1    promethazine (PHENERGAN) 25 mg suppository Insert  into rectum every six (6) hours as needed for Nausea.  butalb/acetaminophen/caffeine (FIORICET PO) Take  by mouth. (Patient not taking: Reported on 6/7/2021)      Diclofenac Potassium (CAMBIA) 50 mg pwpk Take 50 mg by mouth as needed. (Patient not taking: Reported on 6/7/2021) 1 Packet 5    topiramate (TOPAMAX) 50 mg tablet Take 50 mg by mouth two (2) times a day. (Patient not taking: Reported on 6/7/2021)  1    nortriptyline (PAMELOR) 25 mg capsule Take  by mouth nightly.  (Patient not taking: Reported on 6/7/2021)        No Known Allergies  Past Medical History:   Diagnosis Date    Headache     HSV-1 infection       Past Surgical History:   Procedure Laterality Date    HX GYN      [place] LAP,TUBAL CAUTERY        Social History     Socioeconomic History    Marital status:      Spouse name: Not on file    Number of children: Not on file    Years of education: Not on file    Highest education level: Not on file   Occupational History    Not on file   Tobacco Use    Smoking status: Former Smoker     Packs/day: 1.00     Quit date: 2019     Years since quittin.4    Smokeless tobacco: Never Used   Substance and Sexual Activity    Alcohol use: Yes    Drug use: Never    Sexual activity: Yes   Other Topics Concern    Not on file   Social History Narrative    Not on file     Social Determinants of Health     Financial Resource Strain:     Difficulty of Paying Living Expenses:    Food Insecurity:     Worried About Running Out of Food in the Last Year:     [address] in the Last Year:    Transportation Needs:     Lack of Transportation (Medical):  Lack of Transportation (Non-Medical):    Physical Activity:     Days of Exercise per Week:     Minutes of Exercise per Session:    Stress:     Feeling of Stress :    Social Connections:     Frequency of Communication with Friends and Family:     Frequency of Social Gatherings with Friends and Family:     Attends Hoahaoism Services:     Active Member of Clubs or Organizations:     Attends Club or Organization Meetings:     Marital Status:    Intimate Partner Violence:     Fear of Current or Ex-Partner:     Emotionally Abused:     Physically Abused:     Sexually Abused:       Family History   Problem Relation Age of Onset    Migraines Mother     Migraines Sister       Visit Vitals  /80   Pulse 73   Resp 18   Ht 5' 6\" (1.676 m)   Wt 103 kg (227 lb)   SpO2 99%   BMI 36.64 kg/m²        Review of Systems:   A comprehensive review of systems was negative except for that written in the HPI. Neuro Exam:     Appearance:   The patient is well developed, well nourished, provides a coherent history and is in no acute distress. Mental Status: Oriented to time, place and person. Mood and affect appropriate. Cranial Nerves:   Intact visual fields. Fundi are benign. SIVA, EOM's full, no nystagmus, no ptosis. Facial sensation is normal. Corneal reflexes are intact. Facial movement is symmetric. Hearing is normal bilaterally. Palate is midline with normal sternocleidomastoid and trapezius muscles are normal. Tongue is midline. Motor:  5/5 strength in upper and lower proximal and distal muscles. Normal bulk and tone. No fasciculations. Reflexes:   Deep tendon reflexes 2+/4 and symmetrical.   Sensory:   Normal to touch, pinprick and vibration. Gait:  Normal gait. Tremor:   No tremor noted. Cerebellar:  No cerebellar signs present. Neurovascular:  Normal heart sounds and regular rhythm, peripheral pulses intact, and no carotid bruits. Assessment:   Migraine headaches. Is definitely a fan of the Aimovig and will keep track of her headache score and if she finds that there is some increased trending of headache number intensity and duration we can switch to the 140 mg strength. 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 Get good sleep minimize stress and exercise could help. Revisit in about 2 months. Plan:   Revisit 2 months.   Signed by :  Fern Bustos MD

## 2021-06-07 NOTE — PATIENT INSTRUCTIONS
Patient history viewed patient examined. Will recommend continuation of the [address] as is and the Maxalt. If there is any trending of headaches and desires to try the 140 mg dose just let me know and we can make a switch out. Get good sleep minimize stress and regular exercise could help. Revisit in about 2 months.

## 2021-06-07 NOTE — PROGRESS NOTES
Chief Complaint   Patient presents with    Follow-up     headaches; some improvement, last 1.5 mo more migraines; triggered by menses     1. Have you been to the ER, urgent care clinic since your last visit? Hospitalized since your last visit? No     2. Have you seen or consulted any other health care providers outside of the [address] [place] [address] since your last visit? Include any pap smears or colon screening.  No     Visit Vitals  /80   Pulse 73   Resp 18   Ht 5' 6\" (1.676 m)   Wt 103 kg (227 lb)   SpO2 99%   BMI 36.64 kg/m²

## 2021-06-07 NOTE — LETTER
6/7/2021 Patient: Brisa Pagan YOB: 1980 Date of Visit: 6/7/2021 Saira Cardenas MD 
[facility] [phone] [address] Fax: [phone] Dear Saira Cardenas MD, Thank you for referring Ms. Brisa Pagan to [facility] for evaluation. My notes for this consultation are attached. If you have questions, please do not hesitate to call me. I look forward to following your patient along with you.  
 
 
Sincerely, 
 
Sumeet Barrera MD

## 2021-09-02 ENCOUNTER — TELEPHONE (OUTPATIENT)
Dept: NEUROLOGY | Age: 41
End: 2021-09-02

## 2021-09-02 RX ORDER — ERENUMAB-AOOE 140 MG/ML
140 INJECTION, SOLUTION SUBCUTANEOUS ONCE
Qty: 1 EACH | Refills: 5 | Status: SHIPPED | OUTPATIENT
Start: 2021-09-02 | End: 2021-09-02

## 2021-09-02 NOTE — TELEPHONE ENCOUNTER
Pt called requesting to increase the dosage of Aimovig from 70 mg to 140 mg, due to increased frequency of headaches during menstrual cycle. Please call pt at [phone].

## 2021-11-11 ENCOUNTER — TRANSCRIBE ORDER (OUTPATIENT)
Dept: SCHEDULING | Age: 41
End: 2021-11-11

## 2021-11-11 DIAGNOSIS — N64.4 BREAST PAIN: Primary | ICD-10-CM

## 2021-11-17 ENCOUNTER — OFFICE VISIT (OUTPATIENT)
Dept: OBGYN CLINIC | Age: 41
End: 2021-11-17
Payer: COMMERCIAL

## 2021-11-17 VITALS
TEMPERATURE: 97.6 F | WEIGHT: 226.13 LBS | SYSTOLIC BLOOD PRESSURE: 166 MMHG | HEART RATE: 100 BPM | HEIGHT: 66 IN | DIASTOLIC BLOOD PRESSURE: 105 MMHG | OXYGEN SATURATION: 99 % | BODY MASS INDEX: 36.34 KG/M2

## 2021-11-17 DIAGNOSIS — Z01.419 ROUTINE GYNECOLOGICAL EXAMINATION: Primary | ICD-10-CM

## 2021-11-17 PROCEDURE — 99395 PREV VISIT EST AGE 18-39: CPT | Performed by: OBSTETRICS & GYNECOLOGY

## 2021-11-17 NOTE — PROGRESS NOTES
Mahendra Balderrama is a 46 y.o. female who presents today for the following:  Chief Complaint   Patient presents with    Annual Exam     c/o pain in both breast and under arms and pelvis pain        No Known Allergies    Current Outpatient Medications   Medication Sig    valACYclovir (VALTREX) 500 mg tablet Take 1 Tab by mouth daily for 360 days.  valACYclovir (VALTREX) 500 mg tablet Take 1 Tab by mouth two (2) times a day. Indications: genital herpes    butalb/acetaminophen/caffeine (FIORICET PO) Take  by mouth. (Patient not taking: Reported on 2021)    Diclofenac Potassium (CAMBIA) 50 mg pwpk Take 50 mg by mouth as needed. (Patient not taking: Reported on 2021)    topiramate (TOPAMAX) 50 mg tablet Take 50 mg by mouth two (2) times a day. (Patient not taking: Reported on 2021)    nortriptyline (PAMELOR) 25 mg capsule Take  by mouth nightly. (Patient not taking: Reported on 2021)    promethazine (PHENERGAN) 25 mg suppository Insert  into rectum every six (6) hours as needed for Nausea. No current facility-administered medications for this visit. Past Medical History:   Diagnosis Date    Headache     HSV-1 infection        Past Surgical History:   Procedure Laterality Date    HX GYN      [place] LAP,TUBAL CAUTERY         Family History   Problem Relation Age of Onset   [facility] Migraines Mother     Migraines Sister        Social History     Socioeconomic History    Marital status:      Spouse name: Not on file    Number of children: Not on file    Years of education: Not on file    Highest education level: Not on file   Occupational History    Not on file   Tobacco Use    Smoking status: Former Smoker     Packs/day: 1.00     Quit date:      Years since quittin.8    Smokeless tobacco: Never Used   Substance and Sexual Activity    Alcohol use:  Yes    Drug use: Never    Sexual activity: Yes   Other Topics Concern    Not on file   Social History Narrative    Not on file     Social Determinants of Health     Financial Resource Strain:     Difficulty of Paying Living Expenses: Not on file   Food Insecurity:     Worried About Running Out of Food in the Last Year: Not on file    Shaylee of Food in the Last Year: Not on file   Transportation Needs:     Lack of Transportation (Medical): Not on file    Lack of Transportation (Non-Medical): Not on file   Physical Activity:     Days of Exercise per Week: Not on file    Minutes of Exercise per Session: Not on file   Stress:     Feeling of Stress : Not on file   Social Connections:     Frequency of Communication with Friends and Family: Not on file    Frequency of Social Gatherings with Friends and Family: Not on file    Attends Yarsani Services: Not on file    Active Member of [address] [place] [address] [employer] or Organizations: Not on file    Attends Club or Organization Meetings: Not on file    Marital Status: Not on file   Intimate Partner Violence:     Fear of Current or Ex-Partner: Not on file    Emotionally Abused: Not on file    Physically Abused: Not on file    Sexually Abused: Not on file   Housing Stability:     Unable to Pay for Housing in the Last Year: Not on file    Number of [place] in the Last Year: Not on file    Unstable Housing in the Last Year: Not on file         HPI  Here today for annual exam.  Patient complains of breast pain. ROS   Review of Systems   Constitutional: Negative. HENT: Negative. Eyes: Negative. Respiratory: Negative. Cardiovascular: Negative. Gastrointestinal: Negative. Genitourinary: Negative. Musculoskeletal: Negative. Skin: Negative. Neurological: Negative. Endo/Heme/Allergies: Negative. Psychiatric/Behavioral: Negative.       BP (!) 166/105 (BP 1 Location: Left upper arm, BP Patient Position: Sitting, BP Cuff Size: Adult)   Pulse 100   Temp 97.6 °F (36.4 °C) (Temporal)   Ht 5' 6\" (1.676 m)   Wt 226 lb 2 oz (102.6 kg)   LMP 10/22/2021   SpO2 99%   BMI 36.50 kg/m²    OBGyn Exam   Constitutional  · Appearance: well-nourished, well developed, alert, in no acute distress    HENT  · Head and Face: appears normal    Neck  · Inspection/Palpation: normal appearance, no masses or tenderness  · Lymph Nodes: no lymphadenopathy present  · Thyroid: gland size normal, nontender, no nodules or masses present on palpation    Breasts   Symmetric, no palpable masses, no tenderness, no skin changes, no nipple abnormality, no nipple discharge, no lymphadenopathy. Chest  · Respiratory Effort: Even and unlabored  · Auscultation: normal breath sounds    Cardiovascular  · Heart:  · Auscultation: regular rate and rhythm without murmur    Gastrointestinal  · Abdominal Examination: abdomen non-tender to palpation, normal bowel sounds, no masses present  · Liver and spleen: no hepatomegaly present, spleen not palpable  · Hernias: no hernias identified    Genitourinary  · External Genitalia: normal appearance for age, no discharge present, no tenderness present, no inflammatory lesions present, no masses present, no atrophy present  · Vagina: normal vaginal vault without central or paravaginal defects, no discharge present, no inflammatory lesions present, no masses present  · Bladder: non-tender to palpation  · Urethra: appears normal  · Cervix: normal   · Uterus: normal size, shape and consistency  · Adnexa: no adnexal tenderness present, no adnexal masses present  · Perineum: perineum within normal limits, no evidence of trauma, no rashes or skin lesions present  · Anus: anus within normal limits, no hemorrhoids present  · Inguinal Lymph Nodes: no lymphadenopathy present    Skin  · General Inspection: no rash, no lesions identified    Neurologic/Psychiatric  · Mental Status:  · Orientation: grossly oriented to person, place and time  · Mood and Affect: mood normal, affect appropriate    No results found for this visit on 11/17/21.      No orders of the defined types were placed in this encounter. 1. Routine gynecological examination  Reviewed Pap smear/HPV, mammogram, bone density colonoscopy testing guidelines. Encouraged healthy lifestyle. Discussed importanceof getting annual exams. Discussed the risk of osteoporosis and recommended 1200 to 1500 mg of calcium as well as vitamin D supplementation daily. Recommended monthly self breast exams and instructed and demonstrated to the patient on the proper technique educated on the importance of healthy weight management and the significance of not smoking. Patient oriented and reassured about normal physical exam findings.     Follow-up and Dispositions    · Return in about 1 year (around 11/17/2022) for Annual Exam.

## 2021-11-17 NOTE — PROGRESS NOTES
1. Have you been to the ER, urgent care clinic since your last visit? Hospitalized since your last visit? No    2. Have you seen or consulted any other health care providers outside of the [address] [place] [address] since your last visit? Include any pap smears or colon screening.  No    Visit Vitals  BP (!) 166/105 (BP 1 Location: Left upper arm, BP Patient Position: Sitting, BP Cuff Size: Adult)   Pulse 100   Temp 97.6 °F (36.4 °C) (Temporal)   Ht 5' 6\" (1.676 m)   Wt 226 lb 2 oz (102.6 kg)   LMP 10/22/2021   SpO2 99%   BMI 36.50 kg/m²       Chief Complaint   Patient presents with    Annual Exam     c/o pain in both breast and under arms and pelvis pain

## 2022-01-27 ENCOUNTER — TELEPHONE (OUTPATIENT)
Dept: NEUROLOGY | Age: 42
End: 2022-01-27

## 2022-01-28 ENCOUNTER — OFFICE VISIT (OUTPATIENT)
Dept: NEUROLOGY | Age: 42
End: 2022-01-28
Payer: COMMERCIAL

## 2022-01-28 VITALS
SYSTOLIC BLOOD PRESSURE: 124 MMHG | DIASTOLIC BLOOD PRESSURE: 74 MMHG | BODY MASS INDEX: 36.16 KG/M2 | HEIGHT: 66 IN | RESPIRATION RATE: 16 BRPM | OXYGEN SATURATION: 98 % | HEART RATE: 88 BPM | WEIGHT: 225 LBS

## 2022-01-28 DIAGNOSIS — R51.9 MIXED HEADACHE: Primary | ICD-10-CM

## 2022-01-28 DIAGNOSIS — A60.00 RECURRENT GENITAL HERPES: ICD-10-CM

## 2022-01-28 PROCEDURE — 99213 OFFICE O/P EST LOW 20 MIN: CPT | Performed by: SPECIALIST

## 2022-01-28 RX ORDER — ERGOCALCIFEROL 1.25 MG/1
CAPSULE ORAL
COMMUNITY
Start: 2022-01-03 | End: 2022-01-30

## 2022-01-28 RX ORDER — ERENUMAB-AOOE 70 MG/ML
INJECTION SUBCUTANEOUS AS DIRECTED
COMMUNITY
End: 2022-06-15 | Stop reason: DRUGHIGH

## 2022-01-28 NOTE — PROGRESS NOTES
Neurology Consult      Subjective:      Zulema Carrillo is a 46 y.o. female who comes in today with migraine headache follow-up. Aimovig 70 mg does work the only thing we are facing is a prior authorization update and hopefully that can be accomplished soon enough and successfully. Spared her a free sample to get her through at least the next 30 days. Bran continues to work and was reminded that over-the-counter remedies can aid and assist certain headaches that are more intense or are late and being treated etc.  Also the notion that regular exercise could help fortify her natural defense against pain including headache pain. Also getting good sleep and obviously stress reduction helps the cause as well. Does not reference any new medical or surgical history and will suggest a revisit in 4 months. Current Outpatient Medications   Medication Sig Dispense Refill    erenumab-aooe (Aimovig Autoinjector) 70 mg/mL injection as directed.  ergocalciferol (ERGOCALCIFEROL) 1,250 mcg (50,000 unit) capsule       valACYclovir (VALTREX) 500 mg tablet Take 1 Tab by mouth two (2) times a day. Indications: genital herpes 10 Tab 1    promethazine (PHENERGAN) 25 mg suppository Insert  into rectum every six (6) hours as needed for Nausea.  valACYclovir (VALTREX) 500 mg tablet Take 1 Tab by mouth daily for 360 days. (Patient not taking: Reported on 1/28/2022) 90 Tab 3    butalb/acetaminophen/caffeine (FIORICET PO) Take  by mouth. (Patient not taking: Reported on 6/7/2021)      Diclofenac Potassium (CAMBIA) 50 mg pwpk Take 50 mg by mouth as needed. (Patient not taking: Reported on 6/7/2021) 1 Packet 5    topiramate (TOPAMAX) 50 mg tablet Take 50 mg by mouth two (2) times a day. (Patient not taking: Reported on 6/7/2021)  1    nortriptyline (PAMELOR) 25 mg capsule Take  by mouth nightly.  (Patient not taking: Reported on 6/7/2021)        No Known Allergies  Past Medical History:   Diagnosis Date    Headache  HSV-1 infection       Past Surgical History:   Procedure Laterality Date    HX GYN      [place] LAP,TUBAL CAUTERY  2011      Social History     Socioeconomic History    Marital status:      Spouse name: Not on file    Number of children: Not on file    Years of education: Not on file    Highest education level: Not on file   Occupational History    Not on file   Tobacco Use    Smoking status: Former Smoker     Packs/day: 1.00     Quit date: 2019     Years since quitting: 3.0    Smokeless tobacco: Never Used   Substance and Sexual Activity    Alcohol use: Yes    Drug use: Never    Sexual activity: Yes   Other Topics Concern    Not on file   Social History Narrative    Not on file     Social Determinants of Health     Financial Resource Strain:     Difficulty of Paying Living Expenses: Not on file   Food Insecurity:     Worried About Running Out of Food in the Last Year: Not on file    Shaylee of Food in the Last Year: Not on file   Transportation Needs:     Lack of Transportation (Medical): Not on file    Lack of Transportation (Non-Medical):  Not on file   Physical Activity:     Days of Exercise per Week: Not on file    Minutes of Exercise per Session: Not on file   Stress:     Feeling of Stress : Not on file   Social Connections:     Frequency of Communication with Friends and Family: Not on file    Frequency of Social Gatherings with Friends and Family: Not on file    Attends Restoration Services: Not on file    Active Member of Clubs or Organizations: Not on file    Attends Club or Organization Meetings: Not on file    Marital Status: Not on file   Intimate Partner Violence:     Fear of Current or Ex-Partner: Not on file    Emotionally Abused: Not on file    Physically Abused: Not on file    Sexually Abused: Not on file   Housing Stability:     Unable to Pay for Housing in the Last Year: Not on file    Number of [place] in the Last Year: Not on file    Unstable Housing in the Last Year: Not on file      Family History   Problem Relation Age of Onset   [facility] Migraines Mother     Migraines Sister       Visit Vitals  /74 (BP 1 Location: Left upper arm)   Pulse 88   Resp 16   Ht 5' 6\" (1.676 m)   Wt 102.1 kg (225 lb)   SpO2 98%   BMI 36.32 kg/m²        Review of Systems:   A comprehensive review of systems was negative except for that written in the HPI. Neuro Exam:     Appearance: The patient is well developed, well nourished, provides a coherent history and is in no acute distress. Mental Status: Oriented to time, place and person. Mood and affect appropriate. Cranial Nerves:   Intact visual fields. Fundi are benign. SIVA, EOM's full, no nystagmus, no ptosis. Facial sensation is normal. Corneal reflexes are intact. Facial movement is symmetric. Hearing is normal bilaterally. Palate is midline with normal sternocleidomastoid and trapezius muscles are normal. Tongue is midline. Motor:  5/5 strength in upper and lower proximal and distal muscles. Normal bulk and tone. No fasciculations. Reflexes:   Deep tendon reflexes 2+/4 and symmetrical.   Sensory:   Normal to touch, pinprick and vibration. Gait:  Normal gait. Tremor:   No tremor noted. Cerebellar:  No cerebellar signs present. Neurovascular:  Normal heart sounds and regular rhythm, peripheral pulses intact, and no carotid bruits. Assessment:   Mixed headache. Use is labile especially on this visit because I get the very real idea that an intrinsic part of recent headaches has been on the job stress. Suggested walking and regular exercise could help push back on that issue beyond other possibilities. Will check into the authorization process for the Aimovig 70 mg as it does continue to work and we were able to give her a free sample. Maxalt is still good for acute rescue and reminded that over-the-counter remedies can sometimes aid and assist this drug as the situation demands.       Plan: Revisit 4 months.   Signed by :  Kathleen Morrow MD

## 2022-01-28 NOTE — PATIENT INSTRUCTIONS
Patient history viewed patient examined. I am glad to hear the Silviano Cecilia works and will spare her a free sample to get her through this month and hopefully the prior authorization can be accomplished quick enough. Bran continues to work and was educated on the idea of combining with over-the-counter remedies for more intense headaches or a late start on any given headache. Revisit 4 months. Also on the stress end talked about the benefits of regular exercise even walking without particular issue.
83

## 2022-01-28 NOTE — PROGRESS NOTES
Chief Complaint   Patient presents with    Follow-up     migraines, patient states she has a migraine today and the aimovig has PA on it, she states she has had some stress, sample med today     Visit Vitals  /74 (BP 1 Location: Left upper arm)   Pulse 88   Resp 16   Ht 5' 6\" (1.676 m)   Wt 102.1 kg (225 lb)   SpO2 98%   BMI 36.32 kg/m²

## 2022-01-30 ENCOUNTER — HOSPITAL ENCOUNTER (EMERGENCY)
Age: 42
Discharge: HOME | End: 2022-01-30
Attending: EMERGENCY MEDICINE
Payer: COMMERCIAL

## 2022-01-30 VITALS
OXYGEN SATURATION: 100 % | BODY MASS INDEX: 36 KG/M2 | HEIGHT: 66 IN | RESPIRATION RATE: 16 BRPM | WEIGHT: 224 LBS | SYSTOLIC BLOOD PRESSURE: 161 MMHG | DIASTOLIC BLOOD PRESSURE: 99 MMHG | TEMPERATURE: 98.1 F | HEART RATE: 81 BPM

## 2022-01-30 DIAGNOSIS — J32.9 VIRAL SINUSITIS: Primary | ICD-10-CM

## 2022-01-30 DIAGNOSIS — B97.89 VIRAL SINUSITIS: Primary | ICD-10-CM

## 2022-01-30 PROCEDURE — 99282 EMERGENCY DEPT VISIT SF MDM: CPT

## 2022-01-30 RX ORDER — DICLOFENAC SODIUM 50 MG/1
TABLET, DELAYED RELEASE ORAL
COMMUNITY
Start: 2022-01-18

## 2022-01-30 RX ORDER — FLUTICASONE PROPIONATE 50 MCG
2 SPRAY, SUSPENSION (ML) NASAL DAILY
Qty: 16 G | Refills: 0 | Status: SHIPPED | OUTPATIENT
Start: 2022-01-30 | End: 2022-02-04

## 2022-01-30 RX ORDER — KETOROLAC TROMETHAMINE 10 MG/1
10 TABLET, FILM COATED ORAL
Qty: 20 TABLET | Refills: 0 | Status: SHIPPED | OUTPATIENT
Start: 2022-01-30 | End: 2022-02-04

## 2022-01-30 NOTE — ED PROVIDER NOTES
EMERGENCY DEPARTMENT HISTORY AND PHYSICAL EXAM      Date: 1/30/2022  Patient Name: Isaias Gr    History of Presenting Illness     Chief Complaint   Patient presents with    Gland Swelling       History Provided By: Patient    HPI: Isaias Gr, 46 y.o. female with a past medical history significant No significant past medical history presents to the ED with cc of 5 days history of sinus pain and lymph node swelling. Patient denies fevers or chills, denies nausea or vomiting. Patient ports having over-the-counter acetaminophen with some improvement in symptoms. Patient denies ear pain or hearing loss. There are no other complaints, changes, or physical findings at this time. PCP: None    No current facility-administered medications on file prior to encounter. Current Outpatient Medications on File Prior to Encounter   Medication Sig Dispense Refill    diclofenac EC (VOLTAREN) 50 mg EC tablet       erenumab-aooe (Aimovig Autoinjector) 70 mg/mL injection as directed.  [DISCONTINUED] ergocalciferol (ERGOCALCIFEROL) 1,250 mcg (50,000 unit) capsule       [DISCONTINUED] valACYclovir (VALTREX) 500 mg tablet Take 1 Tab by mouth daily for 360 days. (Patient not taking: Reported on 1/28/2022) 90 Tab 3    valACYclovir (VALTREX) 500 mg tablet Take 1 Tab by mouth two (2) times a day. Indications: genital herpes 10 Tab 1    [DISCONTINUED] butalb/acetaminophen/caffeine (FIORICET PO) Take  by mouth. (Patient not taking: Reported on 6/7/2021)      [DISCONTINUED] Diclofenac Potassium (CAMBIA) 50 mg pwpk Take 50 mg by mouth as needed. (Patient not taking: Reported on 6/7/2021) 1 Packet 5    [DISCONTINUED] topiramate (TOPAMAX) 50 mg tablet Take 50 mg by mouth two (2) times a day. (Patient not taking: Reported on 6/7/2021)  1    [DISCONTINUED] nortriptyline (PAMELOR) 25 mg capsule Take  by mouth nightly.  (Patient not taking: Reported on 6/7/2021)      [DISCONTINUED] promethazine (PHENERGAN) 25 mg suppository Insert  into rectum every six (6) hours as needed for Nausea. Past History     Past Medical History:  Past Medical History:   Diagnosis Date    Headache     HSV-1 infection        Past Surgical History:  Past Surgical History:   Procedure Laterality Date    HX GYN      [place] LAP,TUBAL CAUTERY  2011       Family History:  Family History   Problem Relation Age of Onset   [facility] Migraines Mother    [facility] Migraines Sister        Social History:  Social History     Tobacco Use    Smoking status: Former Smoker     Packs/day: 1.00     Quit date: 2019     Years since quitting: 3.0    Smokeless tobacco: Never Used   Substance Use Topics    Alcohol use: Yes    Drug use: Never       Allergies:  No Known Allergies      Review of Systems   Review of Systems   Constitutional: Negative for chills and fever. HENT: Positive for sinus pressure and sinus pain. Eyes: Negative for photophobia and redness. Respiratory: Negative for shortness of breath and wheezing. Cardiovascular: Negative for chest pain and palpitations. Gastrointestinal: Negative for abdominal pain and nausea. Genitourinary: Negative for flank pain and hematuria. Musculoskeletal: Negative for arthralgias and gait problem. Skin: Negative for color change and pallor. Neurological: Negative for dizziness and weakness. Review of Systems    Physical Exam   Physical Exam  Constitutional:       General: No acute distress. Appearance: Normal appearance. Not toxic-appearing. HENT:      Head: Normocephalic and atraumatic. Nose: Nose normal.  No sinus tenderness to palpation. Mouth/Throat:      Mouth: Mucous membranes are moist.  No posterior pharyngeal erythema or exudate     EACs and TMs WNL bilaterally  Eyes:      Extraocular Movements: Extraocular movements intact. Pupils: Pupils are equal, round, and reactive to light. Cardiovascular:      Rate and Rhythm: Normal rate. Pulses: Normal pulses.    Pulmonary: Effort: Pulmonary effort is normal.      Breath sounds: No stridor. Abdominal:      General: Abdomen is flat. There is no distension. Musculoskeletal:         General: Normal range of motion. Cervical back: Normal range of motion and neck supple with no meningismus. Skin:     General: Skin is warm and dry. Capillary Refill: Capillary refill takes less than 2 seconds. Neurological:      General: No focal deficit present. Mental Status: Aert and oriented to person, place, and time. Psychiatric:         Mood and Affect: Mood normal.         Behavior: Behavior normal.     Physical Exam    Lab and Diagnostic Study Results     Labs -   No results found for this or any previous visit (from the past 12 hour(s)). Radiologic Studies -   @lastxrresult@  CT Results  (Last 48 hours)    None        CXR Results  (Last 48 hours)    None            Medical Decision Making   - I am the first provider for this patient. - I reviewed the vital signs, available nursing notes, past medical history, past surgical history, family history and social history. - Initial assessment performed. The patients presenting problems have been discussed, and they are in agreement with the care plan formulated and outlined with them. I have encouraged them to ask questions as they arise throughout their visit. Vital Signs-Reviewed the patient's vital signs. Patient Vitals for the past 12 hrs:   Temp Pulse Resp BP SpO2   01/30/22 0909 98.1 °F (36.7 °C) 81 16 (!) 161/99 100 %         Disposition   Disposition: DC- Adult Discharges: All of the diagnostic tests were reviewed and questions answered. Diagnosis, care plan and treatment options were discussed. The patient understands the instructions and will follow up as directed. The patients results have been reviewed with them. They have been counseled regarding their diagnosis.   The patient verbally convey understanding and agreement of the signs, symptoms, diagnosis, treatment and prognosis and additionally agrees to follow up as recommended with their PCP in 24 - 48 hours. They also agree with the care-plan and convey that all of their questions have been answered. I have also put together some discharge instructions for them that include: 1) educational information regarding their diagnosis, 2) how to care for their diagnosis at home, as well a 3) list of reasons why they would want to return to the ED prior to their follow-up appointment, should their condition change. DISCHARGE PLAN:  1. Current Discharge Medication List      CONTINUE these medications which have NOT CHANGED    Details   diclofenac EC (VOLTAREN) 50 mg EC tablet       erenumab-aooe (Aimovig Autoinjector) 70 mg/mL injection as directed. valACYclovir (VALTREX) 500 mg tablet Take 1 Tab by mouth two (2) times a day. Indications: genital herpes  Qty: 10 Tab, Refills: 1    Associated Diagnoses: HSV (herpes simplex virus) infection           2. Follow-up Information    None       3. Return to ED if worse   4. Current Discharge Medication List      START taking these medications    Details   fluticasone propionate (FLONASE) 50 mcg/actuation nasal spray 2 Sprays by Both Nostrils route daily for 5 days. Qty: 16 g, Refills: 0  Start date: 1/30/2022, End date: 2/4/2022      ketorolac (TORADOL) 10 mg tablet Take 1 Tablet by mouth every six (6) hours as needed for Pain for up to 5 days. Qty: 20 Tablet, Refills: 0  Start date: 1/30/2022, End date: 2/4/2022               Diagnosis     Clinical Impression:   1. Viral sinusitis        Attestations:    Regine Ross MD    Please note that this dictation was completed with That{img}, the Bitly voice recognition software. Quite often unanticipated grammatical, syntax, homophones, and other interpretive errors are inadvertently transcribed by the computer software. Please disregard these errors.   Please excuse any errors that have escaped final proofreading. Thank you.

## 2022-02-09 DIAGNOSIS — B00.9 HSV INFECTION: Primary | ICD-10-CM

## 2022-02-09 RX ORDER — VALACYCLOVIR HYDROCHLORIDE 500 MG/1
500 TABLET, FILM COATED ORAL DAILY
Qty: 30 TABLET | Refills: 8 | Status: SHIPPED | OUTPATIENT
Start: 2022-02-09 | End: 2022-06-15 | Stop reason: SDUPTHER

## 2022-02-11 RX ORDER — VALACYCLOVIR HYDROCHLORIDE 500 MG/1
TABLET, FILM COATED ORAL
Qty: 30 TABLET | Refills: 11 | Status: SHIPPED | OUTPATIENT
Start: 2022-02-11 | End: 2022-07-12 | Stop reason: SDUPTHER

## 2022-03-19 PROBLEM — E66.01 SEVERE OBESITY (HCC): Status: ACTIVE | Noted: 2020-09-29

## 2022-03-28 ENCOUNTER — TELEPHONE (OUTPATIENT)
Dept: NEUROLOGY | Age: 42
End: 2022-03-28

## 2022-03-28 DIAGNOSIS — G43.009 MIGRAINE WITHOUT AURA AND WITHOUT STATUS MIGRAINOSUS, NOT INTRACTABLE: Primary | ICD-10-CM

## 2022-03-28 DIAGNOSIS — R51.9 MIXED HEADACHE: ICD-10-CM

## 2022-03-28 RX ORDER — ERENUMAB-AOOE 70 MG/ML
70 INJECTION SUBCUTANEOUS ONCE
Qty: 1 EACH | Refills: 0 | Status: SHIPPED | COMMUNITY
Start: 2022-03-28 | End: 2022-03-28

## 2022-03-28 NOTE — TELEPHONE ENCOUNTER
Re: Lilian Colón from [facility], PA is needed. Initiated in [facility], key [address]. Per msg: '[employer] does not handle this review. Please visit [email or web address] to start a prior authorization or fax information to [phone]. Please include all supporting chart notes. You may contact N-Trig at [phone].' Archived key. Initiated PA on [employer]. [address] [place] [address] F. PA submitted. Awaiting update.

## 2022-03-28 NOTE — TELEPHONE ENCOUNTER
Patient wants samples of the Aimovig medication please call her and let her know if she can pick them up

## 2022-04-05 ENCOUNTER — TELEPHONE (OUTPATIENT)
Dept: NEUROLOGY | Age: 42
End: 2022-04-05

## 2022-04-07 NOTE — TELEPHONE ENCOUNTER
Re: Bridger Cool denial letter for Aimovig 140mL** . Incorrect quantity was selected on Rx website. Resubmitted PA in Rx benefits. EOC ID: [id number]. Rcvd approval status via [employer] site. Awaiting letter via fax. Called pharmacy w/ update. Sent pt (allan) and nurse msg w/ update.

## 2022-06-03 ENCOUNTER — TELEPHONE (OUTPATIENT)
Dept: NEUROLOGY | Age: 42
End: 2022-06-03

## 2022-06-09 ENCOUNTER — TELEPHONE (OUTPATIENT)
Dept: NEUROLOGY | Age: 42
End: 2022-06-09

## 2022-06-09 NOTE — TELEPHONE ENCOUNTER
Patient called and wants to know if her  Emmanuel Hooker can  her sample injection of AMIOVIG tomorrow? She was suppose to pick it up Tuesday but she has been having car trouble.     Please contact

## 2022-06-10 NOTE — TELEPHONE ENCOUNTER
Cinda Mayer 1980 is pt's . She requested that he p/u sample since she is at work and will not be able to get off until 6 pm.  discussed her PA being approved until 04/2023. She stated she did not have enough cash this month for the copay. Discussed copay card. She will register for it today.

## 2022-06-15 ENCOUNTER — OFFICE VISIT (OUTPATIENT)
Dept: NEUROLOGY | Age: 42
End: 2022-06-15
Payer: COMMERCIAL

## 2022-06-15 VITALS
RESPIRATION RATE: 16 BRPM | OXYGEN SATURATION: 99 % | DIASTOLIC BLOOD PRESSURE: 86 MMHG | BODY MASS INDEX: 36.64 KG/M2 | HEART RATE: 81 BPM | SYSTOLIC BLOOD PRESSURE: 126 MMHG | WEIGHT: 227 LBS

## 2022-06-15 DIAGNOSIS — G43.009 MIGRAINE WITHOUT AURA AND WITHOUT STATUS MIGRAINOSUS, NOT INTRACTABLE: Primary | ICD-10-CM

## 2022-06-15 PROCEDURE — 99213 OFFICE O/P EST LOW 20 MIN: CPT | Performed by: SPECIALIST

## 2022-06-15 RX ORDER — MEFENAMIC ACID 250 MG/1
CAPSULE ORAL
COMMUNITY
Start: 2022-06-13

## 2022-06-15 RX ORDER — RIZATRIPTAN BENZOATE 10 MG/1
10 TABLET ORAL
Qty: 9 TABLET | Refills: 4 | Status: SHIPPED | OUTPATIENT
Start: 2022-06-15 | End: 2022-06-15

## 2022-06-15 RX ORDER — ERENUMAB-AOOE 140 MG/ML
140 INJECTION, SOLUTION SUBCUTANEOUS ONCE
Qty: 1 EACH | Refills: 4 | Status: SHIPPED | OUTPATIENT
Start: 2022-06-15 | End: 2022-06-15

## 2022-06-15 NOTE — PATIENT INSTRUCTIONS
Patient history viewed patient examined. Based on current headache history we will increase the Aimovig to 140 mg monthly and put Maxalt in as a rescue drug. Can be used at moment 0 with over-the-counter remedies for potentially a better combination effect. Get good sleep minimize stress and regular exercise even walking could help.

## 2022-06-15 NOTE — LETTER
6/15/2022    Patient: Cameron Pollack   YOB: 1980   Date of Visit: 6/15/2022     Yassine Zambrano, [address] [phone]  Via Fax: [phone]    Dear Yassine Zambrano MD,      Thank you for referring Ms. Cameron Pollack to [facility] for evaluation. My notes for this consultation are attached. If you have questions, please do not hesitate to call me. I look forward to following your patient along with you.       Sincerely,    Viry Salazar MD

## 2022-06-15 NOTE — PROGRESS NOTES
Neurology Consult      Subjective:      Serafin Diaz is a 38 y.o. female who comes in today on migraine headache follow-up. Has been on the Aimovig 70 mg but I do since that we could do better in terms of overall headache control. For that reason we will increase to 140 mg monthly and she gives herself her own injections. Does not have a truly dedicated migraine rescue drug so we will put Maxalt 10 mg in place. Was reminded she can take it at moment 0 with over-the-counter remedies for potentially better combination effect. Get good sleep minimize stress and exercise could help. Does have some family issues that have been a little taxing on her with regard to tension etc.  I am hoping that that will lighten overall though. Exam today otherwise looks normal.         Current Outpatient Medications   Medication Sig Dispense Refill    mefenamic acid (PONSTEL) 250 mg capsule 1 capsule as needed      erenumab-aooe (Aimovig Autoinjector) 140 mg/mL injection 1 mL by SubCUTAneous route once for 1 dose. 1 Each 4    rizatriptan (MAXALT) 10 mg tablet Take 1 Tablet by mouth once as needed for Migraine for up to 1 dose.  May repeat in 2 hours if needed 9 Tablet 4    valACYclovir (VALTREX) 500 mg tablet TAKE 1 TABLET BY MOUTH EVERY DAY 30 Tablet 11    diclofenac EC (VOLTAREN) 50 mg EC tablet         No Known Allergies  Past Medical History:   Diagnosis Date    Headache     HSV-1 infection       Past Surgical History:   Procedure Laterality Date    HX GYN      [place] LAP,TUBAL CAUTERY  2011      Social History     Socioeconomic History    Marital status:      Spouse name: Not on file    Number of children: Not on file    Years of education: Not on file    Highest education level: Not on file   Occupational History    Not on file   Tobacco Use    Smoking status: Former Smoker     Packs/day: 1.00     Quit date: 2019     Years since quitting: 3.4    Smokeless tobacco: Never Used   Substance and Sexual Activity    Alcohol use: Yes    Drug use: Never    Sexual activity: Yes   Other Topics Concern    Not on file   Social History Narrative    Not on file     Social Determinants of Health     Financial Resource Strain:     Difficulty of Paying Living Expenses: Not on file   Food Insecurity:     Worried About Running Out of Food in the Last Year: Not on file    Shaylee of Food in the Last Year: Not on file   Transportation Needs:     Lack of Transportation (Medical): Not on file    Lack of Transportation (Non-Medical): Not on file   Physical Activity:     Days of Exercise per Week: Not on file    Minutes of Exercise per Session: Not on file   Stress:     Feeling of Stress : Not on file   Social Connections:     Frequency of Communication with Friends and Family: Not on file    Frequency of Social Gatherings with Friends and Family: Not on file    Attends Judaism Services: Not on file    Active Member of [address] [place] [address] [employer] or Organizations: Not on file    Attends Club or Organization Meetings: Not on file    Marital Status: Not on file   Intimate Partner Violence:     Fear of Current or Ex-Partner: Not on file    Emotionally Abused: Not on file    Physically Abused: Not on file    Sexually Abused: Not on file   Housing Stability:     Unable to Pay for Housing in the Last Year: Not on file    Number of [place] in the Last Year: Not on file    Unstable Housing in the Last Year: Not on file      Family History   Problem Relation Age of Onset    Migraines Mother     Migraines Sister       Visit Vitals  /86 (BP 1 Location: Left arm, BP Patient Position: Sitting, BP Cuff Size: Adult)   Pulse 81   Resp 16   Wt 103 kg (227 lb)   SpO2 99%   BMI 36.64 kg/m²        Review of Systems:   A comprehensive review of systems was negative except for that written in the HPI. Neuro Exam:     Appearance: The patient is well developed, well nourished, provides a coherent history and is in no acute distress. Mental Status: Oriented to time, place and person. Mood and affect appropriate. Cranial Nerves:   Intact visual fields. Fundi are benign. SIVA, EOM's full, no nystagmus, no ptosis. Facial sensation is normal. Corneal reflexes are intact. Facial movement is symmetric. Hearing is normal bilaterally. Palate is midline with normal sternocleidomastoid and trapezius muscles are normal. Tongue is midline. Motor:  5/5 strength in upper and lower proximal and distal muscles. Normal bulk and tone. No fasciculations. Reflexes:   Deep tendon reflexes 2+/4 and symmetrical.   Sensory:   Normal to touch, pinprick and vibration. Gait:  Normal gait. Tremor:   No tremor noted. Cerebellar:  No cerebellar signs present. Neurovascular:  Normal heart sounds and regular rhythm, peripheral pulses intact, and no carotid bruits. Assessment:   Migraine headaches. We will increase the Aimovig to 140 mg and hope for better sustained headache control. We will put Maxalt 10 mg in place as a rescue drug which can be used at moment 0 with over-the-counter remedies. Get good sleep minimize stress and exercise could help. Glad to see she is giving herself her own injections. Plan:   Revisit 4 months. Does like the [address] [place] [address] location in terms of her proximity to that office.   Signed by :  Rhea Hernandez MD

## 2022-06-15 NOTE — PROGRESS NOTES
Migraines have increased  Pt reports she is stress and on hot days migraines where bad  Migraine will last all day, and will get about 10 monthly  The week before menstrale cycle will have a migraine the whole week  Would like to discuss a rescue med

## 2022-07-12 DIAGNOSIS — A60.00 RECURRENT GENITAL HERPES: ICD-10-CM

## 2022-07-12 RX ORDER — VALACYCLOVIR HYDROCHLORIDE 500 MG/1
500 TABLET, FILM COATED ORAL DAILY
Qty: 30 TABLET | Refills: 5 | Status: SHIPPED | OUTPATIENT
Start: 2022-07-12

## 2022-07-18 ENCOUNTER — TELEPHONE (OUTPATIENT)
Dept: OBGYN CLINIC | Age: 42
End: 2022-07-18

## 2022-07-20 NOTE — TELEPHONE ENCOUNTER
Patient contacted the office reports would like to schedule follow-up appt having issues with vaginal bleeding.

## 2022-08-04 ENCOUNTER — OFFICE VISIT (OUTPATIENT)
Dept: OBGYN CLINIC | Age: 42
End: 2022-08-04
Payer: COMMERCIAL

## 2022-08-04 VITALS
SYSTOLIC BLOOD PRESSURE: 158 MMHG | BODY MASS INDEX: 36.82 KG/M2 | DIASTOLIC BLOOD PRESSURE: 89 MMHG | OXYGEN SATURATION: 98 % | TEMPERATURE: 97.6 F | HEIGHT: 66 IN | WEIGHT: 229.13 LBS | HEART RATE: 73 BPM

## 2022-08-04 DIAGNOSIS — N91.2 AMENORRHEA: Primary | ICD-10-CM

## 2022-08-04 LAB
HCG URINE, QL. (POC): NEGATIVE
VALID INTERNAL CONTROL?: NO

## 2022-08-04 PROCEDURE — 99213 OFFICE O/P EST LOW 20 MIN: CPT | Performed by: OBSTETRICS & GYNECOLOGY

## 2022-08-04 PROCEDURE — 81025 URINE PREGNANCY TEST: CPT | Performed by: OBSTETRICS & GYNECOLOGY

## 2022-08-04 RX ORDER — MEDROXYPROGESTERONE ACETATE 10 MG/1
10 TABLET ORAL DAILY
Qty: 10 TABLET | Refills: 2 | Status: SHIPPED | OUTPATIENT
Start: 2022-08-04

## 2022-08-04 NOTE — PROGRESS NOTES
Swapnil Rock is a 38 y.o. female who presents today for the following:  Chief Complaint   Patient presents with    Missed Menses     C/o missed cycle for 2 months        No Known Allergies    Current Outpatient Medications   Medication Sig    medroxyPROGESTERone (PROVERA) 10 mg tablet Take 1 Tablet by mouth in the morning. Indications: cessation of menstruation    valACYclovir (VALTREX) 500 mg tablet Take 1 Tablet by mouth daily. mefenamic acid (PONSTEL) 250 mg capsule 1 capsule as needed    diclofenac EC (VOLTAREN) 50 mg EC tablet      No current facility-administered medications for this visit. Past Medical History:   Diagnosis Date    Headache     HSV-1 infection        Past Surgical History:   Procedure Laterality Date    HX GYN      [place] LAP,TUBAL CAUTERY  2011       Family History   Problem Relation Age of Onset    Migraines Mother     Migraines Sister        Social History     Socioeconomic History    Marital status:      Spouse name: Not on file    Number of children: Not on file    Years of education: Not on file    Highest education level: Not on file   Occupational History    Not on file   Tobacco Use    Smoking status: Former     Packs/day: 1.00     Types: Cigarettes     Quit date: 2019     Years since quitting: 3.5    Smokeless tobacco: Never   Substance and Sexual Activity    Alcohol use: Yes    Drug use: Never    Sexual activity: Yes   Other Topics Concern    Not on file   Social History Narrative    Not on file     Social Determinants of Health     Financial Resource Strain: Not on file   Food Insecurity: Not on file   Transportation Needs: Not on file   Physical Activity: Not on file   Stress: Not on file   Social Connections: Not on file   Intimate Partner Violence: Not on file   Housing Stability: Not on file         HPI  52years old patient presented today with complaints of missed periods for 2 months now. She states having her normal cycles monthly since 17years of age. Pregnancy test from today is negative. She has history of tubal ligation. ROS   Review of Systems   Constitutional: Negative. HENT: Negative. Eyes: Negative. Respiratory: Negative. Cardiovascular: Negative. Gastrointestinal: Negative. Genitourinary: Positive for amenorrhea  Musculoskeletal: Negative. Skin: Negative. Neurological: Negative. Endo/Heme/Allergies: Negative. Psychiatric/Behavioral: Negative. BP (!) 158/89 (BP 1 Location: Left upper arm, BP Patient Position: Sitting, BP Cuff Size: Adult)   Pulse 73   Temp 97.6 °F (36.4 °C) (Temporal)   Ht 5' 6\" (1.676 m)   Wt 229 lb 2 oz (103.9 kg)   LMP 05/24/2022 (Exact Date)   SpO2 98%   BMI 36.98 kg/m²    OBGyn Exam   Constitutional  Appearance: well-nourished, well developed, alert, in no acute distress    HENT  Head and Face: appears normal    Neck  Inspection/Palpation: normal appearance, no masses or tenderness  Lymph Nodes: no lymphadenopathy present  Thyroid: gland size normal, nontender, no nodules or masses present on palpation    Chest  Respiratory Effort: Even and unlabored  Auscultation: normal breath sounds    Cardiovascular  Heart:   Auscultation: regular rate and rhythm without murmur    Gastrointestinal  Abdominal Examination: abdomen non-tender to palpation, normal bowel sounds, no masses present  Liver and spleen: no hepatomegaly present, spleen not palpable  Hernias: no hernias identified    Genitourinary  External Genitalia: normal appearance for age, no discharge present, no tenderness present, no inflammatory lesions present, no masses present, no atrophy present  Vagina: normal vaginal vault without central or paravaginal defects, no discharge present, no inflammatory lesions present, no masses present  Bladder: non-tender to palpation  Urethra: appears normal  Cervix: normal   Uterus: normal size, shape and consistency  Adnexa: no adnexal tenderness present, no adnexal masses present  Perineum: perineum within normal limits, no evidence of trauma, no rashes or skin lesions present  Anus: anus within normal limits, no hemorrhoids present  Inguinal Lymph Nodes: no lymphadenopathy present    Skin  General Inspection: no rash, no lesions identified    Neurologic/Psychiatric  Mental Status:  Orientation: grossly oriented to person, place and time  Mood and Affect: mood normal, affect appropriate    Results for orders placed or performed in visit on 08/04/22   AMB POC URINE PREGNANCY TEST, VISUAL COLOR COMPARISON   Result Value Ref Range    VALID INTERNAL CONTROL POC No     HCG urine, Ql. (POC) Negative Negative        Orders Placed This Encounter    TSH RFX ON ABNORMAL TO FREE T4 ([facility] Default)    FSH AND LH    PROLACTIN    AMB POC URINE PREGNANCY TEST, VISUAL COLOR COMPARISON    medroxyPROGESTERone (PROVERA) 10 mg tablet     Sig: Take 1 Tablet by mouth in the morning. Indications: cessation of menstruation     Dispense:  10 Tablet     Refill:  2         1. Amenorrhea    - AMB POC URINE PREGNANCY TEST, VISUAL COLOR COMPARISON  - TSH RFX ON ABNORMAL TO FREE T4  - FSH AND LH  - PROLACTIN  - medroxyPROGESTERone (PROVERA) 10 mg tablet; Take 1 Tablet by mouth in the morning. Indications: cessation of menstruation  Dispense: 10 Tablet; Refill: 2        Follow-up and Dispositions    Return in about 2 weeks (around 8/18/2022) for Discuss labs, treatment options.

## 2022-08-08 ENCOUNTER — TELEPHONE (OUTPATIENT)
Dept: NEUROLOGY | Age: 42
End: 2022-08-08

## 2022-08-08 NOTE — TELEPHONE ENCOUNTER
Pt calling stating the pharmacy wont release her rx for Aimovig Autoinjector 140 mg/mL subcutaneous auto-injector , stating that there are no dose instructions on it. Please call pharmacy, [facility] [place] [address] [address].

## 2022-08-08 NOTE — TELEPHONE ENCOUNTER
Pt calling about aimovig, says pharmacy wont release her prescription until they speak with a nurse regarding the medication directions.

## 2022-08-09 ENCOUNTER — TELEPHONE (OUTPATIENT)
Dept: OBGYN CLINIC | Age: 42
End: 2022-08-09

## 2022-08-09 LAB
FSH SERPL-ACNC: 12.3 MIU/ML
LH SERPL-ACNC: 11.1 MIU/ML
PROLACTIN SERPL-MCNC: 7.8 NG/ML (ref 4.8–23.3)
TSH SERPL DL<=0.005 MIU/L-ACNC: 0.83 UIU/ML (ref 0.45–4.5)

## 2022-08-09 NOTE — TELEPHONE ENCOUNTER
Patient contacted the office reports would like to know lab results. Advised message sent to her portal with normal lab results. Scheduled 2 week follow-up.

## 2022-08-24 ENCOUNTER — OFFICE VISIT (OUTPATIENT)
Dept: OBGYN CLINIC | Age: 42
End: 2022-08-24
Payer: COMMERCIAL

## 2022-08-24 VITALS
DIASTOLIC BLOOD PRESSURE: 96 MMHG | SYSTOLIC BLOOD PRESSURE: 139 MMHG | OXYGEN SATURATION: 99 % | TEMPERATURE: 98 F | BODY MASS INDEX: 40.14 KG/M2 | HEIGHT: 63 IN | WEIGHT: 226.56 LBS | HEART RATE: 71 BPM

## 2022-08-24 DIAGNOSIS — N91.2 AMENORRHEA: Primary | ICD-10-CM

## 2022-08-24 PROCEDURE — 99212 OFFICE O/P EST SF 10 MIN: CPT | Performed by: OBSTETRICS & GYNECOLOGY

## 2022-08-24 RX ORDER — MEDROXYPROGESTERONE ACETATE 10 MG/1
10 TABLET ORAL DAILY
Qty: 90 TABLET | Refills: 1 | Status: SHIPPED | OUTPATIENT
Start: 2022-08-24 | End: 2023-02-20

## 2022-08-24 RX ORDER — ERENUMAB-AOOE 70 MG/ML
140 INJECTION SUBCUTANEOUS
COMMUNITY
End: 2022-10-18 | Stop reason: SDUPTHER

## 2022-08-24 NOTE — PROGRESS NOTES
Paolo Lewis is a 38 y.o. female who presents today for the following:  Chief Complaint   Patient presents with    Follow-up     Discuss results and treatment options        No Known Allergies    Current Outpatient Medications   Medication Sig    medroxyPROGESTERone (PROVERA) 10 mg tablet Take 1 Tablet by mouth daily for 180 days. Take 1 pill daily for 10 days every month  Indications: prevention of abnormal growth in cells of uterine lining    medroxyPROGESTERone (PROVERA) 10 mg tablet Take 1 Tablet by mouth in the morning. Indications: cessation of menstruation    valACYclovir (VALTREX) 500 mg tablet Take 1 Tablet by mouth daily. erenumab-aooe (Aimovig Autoinjector) 70 mg/mL injection as directed Subcutaneous monthly    mefenamic acid (PONSTEL) 250 mg capsule 1 capsule as needed    diclofenac EC (VOLTAREN) 50 mg EC tablet      No current facility-administered medications for this visit.        Past Medical History:   Diagnosis Date    Headache     HSV-1 infection        Past Surgical History:   Procedure Laterality Date    HX GYN      [place] LAP,TUBAL CAUTERY  2011       Family History   Problem Relation Age of Onset    Migraines Mother     Migraines Sister        Social History     Socioeconomic History    Marital status:      Spouse name: Not on file    Number of children: Not on file    Years of education: Not on file    Highest education level: Not on file   Occupational History    Not on file   Tobacco Use    Smoking status: Former     Packs/day: 1.00     Types: Cigarettes     Quit date: 2019     Years since quitting: 3.6    Smokeless tobacco: Never   Substance and Sexual Activity    Alcohol use: Yes    Drug use: Never    Sexual activity: Yes   Other Topics Concern    Not on file   Social History Narrative    Not on file     Social Determinants of Health     Financial Resource Strain: Not on file   Food Insecurity: Not on file   Transportation Needs: Not on file   Physical Activity: Not on file Stress: Not on file   Social Connections: Not on file   Intimate Partner Violence: Not on file   Housing Stability: Not on file         HPI  38years old patient with history of amenorrhea who presented today to discuss laboratory results treatment options. ROS   Review of Systems   Constitutional: Negative. HENT: Negative. Eyes: Negative. Respiratory: Negative. Cardiovascular: Negative. Gastrointestinal: Negative. Genitourinary: Positive for amenorrhea  Musculoskeletal: Negative. Skin: Negative. Neurological: Negative. Endo/Heme/Allergies: Negative. Psychiatric/Behavioral: Negative. BP (!) 139/96 (BP 1 Location: Right arm, BP Patient Position: Sitting, BP Cuff Size: Adult long)   Pulse 71   Temp 98 °F (36.7 °C) (Temporal)   Ht 5' 3\" (1.6 m)   Wt 226 lb 9 oz (102.8 kg)   LMP 08/19/2022 (Exact Date)   SpO2 99%   BMI 40.13 kg/m²    OBGyn Exam   Constitutional  Appearance: well-nourished, well developed, alert, in no acute distress    Neurologic/Psychiatric  Mental Status:  Orientation: grossly oriented to person, place and time  Mood and Affect: mood normal, affect appropriate    No results found for this visit on 08/24/22. Orders Placed This Encounter    US TRANSVAGINAL     Standing Status:   Future     Standing Expiration Date:   10/24/2022     Order Specific Question:   Is Patient Pregnant? Answer:   No    erenumab-aooe (Aimovig Autoinjector) 70 mg/mL injection     Sig: as directed Subcutaneous monthly    medroxyPROGESTERone (PROVERA) 10 mg tablet     Sig: Take 1 Tablet by mouth daily for 180 days. Take 1 pill daily for 10 days every month  Indications: prevention of abnormal growth in cells of uterine lining     Dispense:  90 Tablet     Refill:  1         1. Amenorrhea  Normal laboratory work-up discussed with patient today. Treatment options for endometrial hyperplasia prevention discussed with patient today.   Patient opted for monthly p.o. progesterone treatment. A pelvic ultrasound ordered to assess endometrial lining/thickness and other possible pathology. Encouraged healthy lifestyle (educated on the importance of healthy weight management and the significance of not smoking). - US TRANSVAGINAL; Future  - medroxyPROGESTERone (PROVERA) 10 mg tablet; Take 1 Tablet by mouth daily for 180 days. Take 1 pill daily for 10 days every month  Indications: prevention of abnormal growth in cells of uterine lining  Dispense: 90 Tablet; Refill: 1        Follow-up and Dispositions    Return if symptoms worsen or fail to improve.

## 2022-08-25 ENCOUNTER — TRANSCRIBE ORDER (OUTPATIENT)
Dept: SCHEDULING | Age: 42
End: 2022-08-25

## 2022-08-25 DIAGNOSIS — N91.2 AMENORRHEA: Primary | ICD-10-CM

## 2022-09-01 ENCOUNTER — HOSPITAL ENCOUNTER (OUTPATIENT)
Dept: ULTRASOUND IMAGING | Age: 42
Discharge: HOME | End: 2022-09-01
Attending: OBSTETRICS & GYNECOLOGY
Payer: COMMERCIAL

## 2022-09-01 ENCOUNTER — TELEPHONE (OUTPATIENT)
Dept: OBGYN CLINIC | Age: 42
End: 2022-09-01

## 2022-09-01 DIAGNOSIS — N91.2 AMENORRHEA: ICD-10-CM

## 2022-09-01 PROCEDURE — 76830 TRANSVAGINAL US NON-OB: CPT

## 2022-09-01 PROCEDURE — 76856 US EXAM PELVIC COMPLETE: CPT

## 2022-09-01 NOTE — TELEPHONE ENCOUNTER
Patient contacted office reports had imaging done today and has received results via portal.  Requesting results advised that provider unavailable at time and results are in review.

## 2022-09-02 NOTE — TELEPHONE ENCOUNTER
Spoke with patient advised that ultrasound was normal small anterior intramural fibroid, incidental finding.

## 2022-10-18 ENCOUNTER — TRANSCRIBE ORDER (OUTPATIENT)
Dept: SCHEDULING | Age: 42
End: 2022-10-18

## 2022-10-18 ENCOUNTER — OFFICE VISIT (OUTPATIENT)
Dept: NEUROLOGY | Age: 42
End: 2022-10-18
Payer: COMMERCIAL

## 2022-10-18 VITALS
HEART RATE: 69 BPM | RESPIRATION RATE: 20 BRPM | OXYGEN SATURATION: 96 % | SYSTOLIC BLOOD PRESSURE: 140 MMHG | DIASTOLIC BLOOD PRESSURE: 96 MMHG

## 2022-10-18 DIAGNOSIS — G43.009 MIGRAINE WITHOUT AURA AND WITHOUT STATUS MIGRAINOSUS, NOT INTRACTABLE: Primary | ICD-10-CM

## 2022-10-18 DIAGNOSIS — Z12.31 VISIT FOR SCREENING MAMMOGRAM: Primary | ICD-10-CM

## 2022-10-18 PROCEDURE — 99213 OFFICE O/P EST LOW 20 MIN: CPT | Performed by: SPECIALIST

## 2022-10-18 RX ORDER — ERENUMAB-AOOE 70 MG/ML
140 INJECTION SUBCUTANEOUS
Qty: 2 ML | Refills: 4 | Status: SHIPPED | OUTPATIENT
Start: 2022-10-18

## 2022-10-18 RX ORDER — RIZATRIPTAN BENZOATE 10 MG/1
10 TABLET ORAL
Qty: 9 TABLET | Refills: 4 | Status: SHIPPED | OUTPATIENT
Start: 2022-10-18 | End: 2022-10-18

## 2022-10-18 NOTE — PROGRESS NOTES
Neurology Consult      Subjective:      Juan Askew is a 38 y.o. female who comes in today on migraine headache follow-up. Has been doing fantastic except in recent times there is been issues with getting the [facility].  Has another specialty pharmacy she wants me to route that through. We will see if we can spare her free injectable today as she is missed her last months injection cycle. Will prescribe the Maxalt as a rescue drug which can be used at moment with an over-the-counter remedy if the headache situation demands it. Says she gives herself her own Aimovig injection and there is no difficulties. So far no downside to taking the drug. Does say that her eye doctor is concerned she may have underlying diabetes based on eye exam so that is being followed through with at this point. Unfortunately says that diabetes runs very thick through her family. I will suggest a revisit in 4 months and she seems to be comfortable with that. Hopefully these issues are straightened out as a goes to both the [address] [place] [address] Avenue. Current Outpatient Medications   Medication Sig Dispense Refill    rizatriptan (MAXALT) 10 mg tablet Take 1 Tablet by mouth once as needed for Migraine for up to 1 dose. May repeat in 2 hours if needed 9 Tablet 4    erenumab-aooe (Aimovig Autoinjector) 70 mg/mL injection 2 mL by SubCUTAneous route every thirty (30) days. 2 mL 4    medroxyPROGESTERone (PROVERA) 10 mg tablet Take 1 Tablet by mouth daily for 180 days. Take 1 pill daily for 10 days every month  Indications: prevention of abnormal growth in cells of uterine lining 90 Tablet 1    medroxyPROGESTERone (PROVERA) 10 mg tablet Take 1 Tablet by mouth in the morning. Indications: cessation of menstruation 10 Tablet 2    valACYclovir (VALTREX) 500 mg tablet Take 1 Tablet by mouth daily.  30 Tablet 5    mefenamic acid (PONSTEL) 250 mg capsule 1 capsule as needed      diclofenac EC (VOLTAREN) 50 mg EC tablet         No Known Allergies  Past Medical History:   Diagnosis Date    Headache     HSV-1 infection       Past Surgical History:   Procedure Laterality Date    HX GYN      [place] LAP,TUBAL CAUTERY  2011      Social History     Socioeconomic History    Marital status:      Spouse name: Not on file    Number of children: Not on file    Years of education: Not on file    Highest education level: Not on file   Occupational History    Not on file   Tobacco Use    Smoking status: Former     Packs/day: 1.00     Types: Cigarettes     Quit date: 2019     Years since quitting: 3.7    Smokeless tobacco: Never   Substance and Sexual Activity    Alcohol use: Yes    Drug use: Never    Sexual activity: Yes   Other Topics Concern    Not on file   Social History Narrative    Not on file     Social Determinants of Health     Financial Resource Strain: Not on file   Food Insecurity: Not on file   Transportation Needs: Not on file   Physical Activity: Not on file   Stress: Not on file   Social Connections: Not on file   Intimate Partner Violence: Not on file   Housing Stability: Not on file      Family History   Problem Relation Age of Onset    Migraines Mother     Migraines Sister       Visit Vitals  BP (!) 140/96 (BP 1 Location: Left upper arm, BP Patient Position: Sitting, BP Cuff Size: Adult)   Pulse 69   Resp 20   SpO2 96%        Review of Systems:   A comprehensive review of systems was negative except for that written in the HPI. Neuro Exam:     Appearance: The patient is well developed, well nourished, provides a coherent history and is in no acute distress. Mental Status: Oriented to time, place and person. Mood and affect appropriate. Cranial Nerves:   Intact visual fields. Fundi are benign. SIVA, EOM's full, no nystagmus, no ptosis. Facial sensation is normal. Corneal reflexes are intact. Facial movement is symmetric. Hearing is normal bilaterally.  Palate is midline with normal sternocleidomastoid and trapezius muscles are normal. Tongue is midline. Motor:  5/5 strength in upper and lower proximal and distal muscles. Normal bulk and tone. No fasciculations. Reflexes:   Deep tendon reflexes 2+/4 and symmetrical.   Sensory:   Normal to touch, pinprick and vibration. Gait:  Normal gait. Tremor:   No tremor noted. Cerebellar:  No cerebellar signs present. Neurovascular:  Normal heart sounds and regular rhythm, peripheral pulses intact, and no carotid bruits. Assessment:   Migraine headaches without aura without status migrainosus etc.  We will renew the Aimovig through her specialty pharmacy in [place]. We will [facility] the [facility] as [facility]  Again reminded she can take the Maxalt with over-the-counter Tylenol if the headache demands it. Revisit in 4 months. Plan:   Revisit 4 months.   Signed by :  Altagracia Vizcaino MD

## 2022-10-18 NOTE — LETTER
10/18/2022    Patient: Trenton Combs   YOB: 1980   Date of Visit: 10/18/2022     Milton Mcknight, [address] [phone]  Via Fax: [phone]    Dear Milton Mcknight MD,      Thank you for referring Ms. Ely Arzola to [facility] for evaluation. My notes for this consultation are attached. If you have questions, please do not hesitate to call me. I look forward to following your patient along with you.       Sincerely,    Chanel Coughlin MD

## 2022-10-18 NOTE — PROGRESS NOTES
Migraines- hasn't been able to get her shot for this month, she has had ot change pharmacies and the old pharmacy wanted to charge her a lot of money     For the last couple of days she has been having one since she is due for injection

## 2022-10-18 NOTE — PATIENT INSTRUCTIONS
Patient history viewed patient examined. We will see if we have a free sample of injectable Aimovig to share and we will see if we can get this drug routed to the right pharmaceutical firm and [facility] as a rescue drug. Was reminded on the Maxalt that she can actually combine it with an over-the-counter remedy such as Tylenol if the headache has unusual intense characteristics. Revisit 4 months.

## 2022-11-09 ENCOUNTER — HOSPITAL ENCOUNTER (OUTPATIENT)
Dept: MAMMOGRAPHY | Age: 42
Discharge: HOME | End: 2022-11-09
Payer: COMMERCIAL

## 2022-11-09 DIAGNOSIS — Z12.31 VISIT FOR SCREENING MAMMOGRAM: ICD-10-CM

## 2022-11-09 PROCEDURE — 77063 BREAST TOMOSYNTHESIS BI: CPT

## 2022-11-14 ENCOUNTER — OFFICE VISIT (OUTPATIENT)
Dept: NEUROLOGY | Age: 42
End: 2022-11-14
Payer: COMMERCIAL

## 2022-11-14 VITALS
OXYGEN SATURATION: 100 % | RESPIRATION RATE: 18 BRPM | SYSTOLIC BLOOD PRESSURE: 146 MMHG | BODY MASS INDEX: 40.04 KG/M2 | DIASTOLIC BLOOD PRESSURE: 92 MMHG | HEIGHT: 63 IN | HEART RATE: 84 BPM | WEIGHT: 226 LBS

## 2022-11-14 DIAGNOSIS — G93.2 IIH (IDIOPATHIC INTRACRANIAL HYPERTENSION): ICD-10-CM

## 2022-11-14 DIAGNOSIS — H47.10 PAPILLEDEMA: Primary | ICD-10-CM

## 2022-11-14 PROCEDURE — 99214 OFFICE O/P EST MOD 30 MIN: CPT | Performed by: SPECIALIST

## 2022-11-14 NOTE — PROGRESS NOTES
Neurology Consult      Subjective:      Wai Padilla is a 38 y.o. female who comes in today with her mom. This was a referral from the eye doctor who spotted papilledema. Patient's headaches continue with the Aimovig injectable as a preventative and the Maxalt as a rescue. We went over the ramifications of papilledema as to its connection with headaches occasional changes of vision etc. there was also the affiliation with obesity and current weight here is listed at 226 pounds. The BMI is listed just over 40. Says she is in the process of trying to lose weight. And does on further questioning in the last 1 to 2 weeks has noticed some interesting visual attributes. If she bends over and comes back up or moves her head quickly she will notice some brief blurriness of vision that within seconds clears. Has not noticed any breakout double vision. On occasion has noted hearing her heartbeat in 1 or both ears for a brief instance and stops. Went over the multistep process of investigation of the suspected increased pressure and it included imaging of the head and today we will order a brain MRI with and without contrast and an MRV of the brain as well. From there we talked about proceeding potentially to a spinal tap checking opening pressure and spinal fluid constituents. Is done in x-ray under fluoroscopy and there are normally no repercussions. However went over the idea of a negative pressure leak and positional headaches etc. with patient and mother present. We have means to handle this if that particular circumstance arises. At this time patient is ready to move forward with the testing and the imaging will be our first steps. Will call me after imaging is done and we can discuss results and proceed with the investigative LP and opening pressure etc.  Further suggestions obviously could follow.          Current Outpatient Medications   Medication Sig Dispense Refill    erenumab-aooe (Eugenio Feliz Autoinjector) 70 mg/mL injection 2 mL by SubCUTAneous route every thirty (30) days. 2 mL 4    medroxyPROGESTERone (PROVERA) 10 mg tablet Take 1 Tablet by mouth daily for 180 days. Take 1 pill daily for 10 days every month  Indications: prevention of abnormal growth in cells of uterine lining 90 Tablet 1    medroxyPROGESTERone (PROVERA) 10 mg tablet Take 1 Tablet by mouth in the morning. Indications: cessation of menstruation 10 Tablet 2    valACYclovir (VALTREX) 500 mg tablet Take 1 Tablet by mouth daily.  30 Tablet 5    mefenamic acid (PONSTEL) 250 mg capsule 1 capsule as needed      diclofenac EC (VOLTAREN) 50 mg EC tablet         No Known Allergies  Past Medical History:   Diagnosis Date    Headache     HSV-1 infection       Past Surgical History:   Procedure Laterality Date    HX GYN      ID LAP,TUBAL CAUTERY  2011      Social History     Socioeconomic History    Marital status:      Spouse name: Not on file    Number of children: Not on file    Years of education: Not on file    Highest education level: Not on file   Occupational History    Not on file   Tobacco Use    Smoking status: Former     Packs/day: 1.00     Types: Cigarettes     Quit date: 2019     Years since quitting: 3.8    Smokeless tobacco: Never   Substance and Sexual Activity    Alcohol use: Yes    Drug use: Never    Sexual activity: Yes   Other Topics Concern    Not on file   Social History Narrative    Not on file     Social Determinants of Health     Financial Resource Strain: Not on file   Food Insecurity: Not on file   Transportation Needs: Not on file   Physical Activity: Not on file   Stress: Not on file   Social Connections: Not on file   Intimate Partner Violence: Not on file   Housing Stability: Not on file      Family History   Problem Relation Age of Onset    Ovarian Cancer Mother     Migraines Mother     Ovarian Cancer Sister     Migraines Sister       Visit Vitals  BP (!) 146/92 (BP 1 Location: Left upper arm, BP Patient Position: Sitting, BP Cuff Size: Adult long)   Pulse 84   Resp 18   Ht 5' 3\" (1.6 m)   Wt 102.5 kg (226 lb)   SpO2 100%   BMI 40.03 kg/m²        Review of Systems:   A comprehensive review of systems was negative except for that written in the HPI. Neuro Exam:     Appearance: The patient is well developed, well nourished, provides a coherent history and is in no acute distress. Mental Status: Oriented to time, place and person. Mood and affect appropriate. Cranial Nerves:   Intact visual fields. Fundi are remarkable for circumpapillary swelling of the optic nerve heads but no obscuration of vessels no hemorrhages exudate or spasm etc. SIVA, EOM's full, no nystagmus, no ptosis. Facial sensation is normal. Corneal reflexes are intact. Facial movement is symmetric. Hearing is normal bilaterally. Palate is midline with normal sternocleidomastoid and trapezius muscles are normal. Tongue is midline. Visual acuity near with near lens was 20/20 OU. APD negative. Motor:  5/5 strength in upper and lower proximal and distal muscles. Normal bulk and tone. No fasciculations. Reflexes:   Deep tendon reflexes 2+/4 and symmetrical.   Sensory:   Normal to touch, pinprick and vibration. Gait:  Normal gait. Tremor:   No tremor noted. Cerebellar:  No cerebellar signs present. Neurovascular:  Normal heart sounds and regular rhythm, peripheral pulses intact, and no carotid bruits. Assessment:   Papilledema and concerns with headaches going to idiopathic intracranial hypertension etc. we will set up brain MRI and MRV. We will go from there and we talked about the neck step potentially being a lumbar puncture under fluoroscopy. This would help us to obtain an opening pressure and check spinal fluid constituents. That in turn would lead to a neck step of treatment and judicious weight loss.   I asked patient to call me after the imaging of the head is done to confirm our next step with lumbar puncture etc.      Plan:   Revisit pended with testing unfolding as noted.   Signed by :  Alfreda Reeves MD

## 2022-11-14 NOTE — PATIENT INSTRUCTIONS
Patient history viewed patient examined. At this point a new consideration for her headaches includes the possibility of idiopathic intracranial hypertension or pseudotumor cerebri among other names. Explained the basic cause-and-effect relations between the pressure and the headache. To further investigate the issue we will get imaging of the head and make sure there are not any other considerations beyond this diagnosis and then we talked about a lumbar puncture to check opening pressure and spinal fluid beyond the imaging. We talked about also the issue of a spinal fluid leak causing a positional type headache. If that has to be the case we can treat it by different means. Will pend the revisit until we can get the imaging and hopefully the lumbar puncture accomplished for our next steps.

## 2022-11-14 NOTE — LETTER
11/14/2022    Patient: Guerda Mancini   YOB: 1980   Date of Visit: 11/14/2022     Tray Colón, [address] [phone]  Via Fax: [phone]    Dear Tray Colón MD,      Thank you for referring Ms. Rubi Munoz to [facility] for evaluation. My notes for this consultation are attached. If you have questions, please do not hesitate to call me. I look forward to following your patient along with you.       Sincerely,    Brooke Salazar MD

## 2022-11-17 ENCOUNTER — TRANSCRIBE ORDER (OUTPATIENT)
Dept: SCHEDULING | Age: 42
End: 2022-11-17

## 2022-11-17 DIAGNOSIS — Z12.39 SCREENING FOR MALIGNANT NEOPLASM OF BREAST: Primary | ICD-10-CM

## 2022-11-29 ENCOUNTER — HOSPITAL ENCOUNTER (OUTPATIENT)
Dept: MRI IMAGING | Age: 42
Discharge: HOME | End: 2022-11-29
Payer: COMMERCIAL

## 2022-11-29 ENCOUNTER — HOSPITAL ENCOUNTER (OUTPATIENT)
Dept: MRI IMAGING | Age: 42
End: 2022-11-29
Payer: COMMERCIAL

## 2022-11-29 ENCOUNTER — TELEPHONE (OUTPATIENT)
Dept: NEUROLOGY | Age: 42
End: 2022-11-29

## 2022-11-29 DIAGNOSIS — G93.2 IIH (IDIOPATHIC INTRACRANIAL HYPERTENSION): ICD-10-CM

## 2022-11-29 DIAGNOSIS — G93.2 IIH (IDIOPATHIC INTRACRANIAL HYPERTENSION): Primary | ICD-10-CM

## 2022-11-29 DIAGNOSIS — H47.10 PAPILLEDEMA: ICD-10-CM

## 2022-11-29 PROCEDURE — 74011250636 HC RX REV CODE- 250/636: Performed by: STUDENT IN AN ORGANIZED HEALTH CARE EDUCATION/TRAINING PROGRAM

## 2022-11-29 PROCEDURE — 70553 MRI BRAIN STEM W/O & W/DYE: CPT

## 2022-11-29 PROCEDURE — 70544 MR ANGIOGRAPHY HEAD W/O DYE: CPT

## 2022-11-29 PROCEDURE — A9577 INJ MULTIHANCE: HCPCS | Performed by: STUDENT IN AN ORGANIZED HEALTH CARE EDUCATION/TRAINING PROGRAM

## 2022-11-29 RX ADMIN — GADOBENATE DIMEGLUMINE 20 ML: 529 INJECTION, SOLUTION INTRAVENOUS at 10:55

## 2022-12-01 ENCOUNTER — TRANSCRIBE ORDER (OUTPATIENT)
Dept: SCHEDULING | Age: 42
End: 2022-12-01

## 2022-12-01 DIAGNOSIS — G93.2 BENIGN INTRACRANIAL HYPERTENSION: Primary | ICD-10-CM

## 2023-01-16 ENCOUNTER — HOSPITAL ENCOUNTER (OUTPATIENT)
Dept: GENERAL RADIOLOGY | Age: 43
Discharge: HOME | End: 2023-01-16
Payer: COMMERCIAL

## 2023-01-16 VITALS
BODY MASS INDEX: 35.36 KG/M2 | HEART RATE: 72 BPM | HEIGHT: 66 IN | RESPIRATION RATE: 16 BRPM | TEMPERATURE: 99.1 F | SYSTOLIC BLOOD PRESSURE: 131 MMHG | OXYGEN SATURATION: 99 % | WEIGHT: 220 LBS | DIASTOLIC BLOOD PRESSURE: 83 MMHG

## 2023-01-16 DIAGNOSIS — G93.2 IIH (IDIOPATHIC INTRACRANIAL HYPERTENSION): ICD-10-CM

## 2023-01-16 LAB
APPEARANCE CSF: CLEAR
APTT PPP: 27.7 SEC (ref 21.2–34.1)
BASOPHILS # BLD: 0 K/UL (ref 0–0.1)
BASOPHILS NFR BLD: 1 % (ref 0–1)
COLOR CSF: COLORLESS
COLOR SPUN CSF: COLORLESS
DIFFERENTIAL METHOD BLD: ABNORMAL
EOSINOPHIL # BLD: 0.1 K/UL (ref 0–0.4)
EOSINOPHIL NFR BLD: 2 % (ref 0–7)
ERYTHROCYTE [DISTWIDTH] IN BLOOD BY AUTOMATED COUNT: 13 % (ref 11.5–14.5)
GLUCOSE CSF-MCNC: 56 MG/DL (ref 40–70)
HCT VFR BLD AUTO: 35.5 % (ref 35–47)
HGB BLD-MCNC: 11.5 G/DL (ref 11.5–16)
IMM GRANULOCYTES # BLD AUTO: 0 K/UL (ref 0–0.04)
IMM GRANULOCYTES NFR BLD AUTO: 0 % (ref 0–0.5)
INR PPP: 1 (ref 0.9–1.1)
LYMPHOCYTES # BLD: 2.3 K/UL (ref 0.8–3.5)
LYMPHOCYTES NFR BLD: 30 % (ref 12–49)
MCH RBC QN AUTO: 31 PG (ref 26–34)
MCHC RBC AUTO-ENTMCNC: 32.4 G/DL (ref 30–36.5)
MCV RBC AUTO: 95.7 FL (ref 80–99)
MONOCYTES # BLD: 0.6 K/UL (ref 0–1)
MONOCYTES NFR BLD: 8 % (ref 5–13)
NEUTS SEG # BLD: 4.6 K/UL (ref 1.8–8)
NEUTS SEG NFR BLD: 59 % (ref 32–75)
NRBC # BLD: 0 K/UL (ref 0–0.01)
NRBC BLD-RTO: 0 PER 100 WBC
PLATELET # BLD AUTO: 279 K/UL (ref 150–400)
PMV BLD AUTO: 10.3 FL (ref 8.9–12.9)
PROT CSF-MCNC: 22 MG/DL (ref 15–45)
PROTHROMBIN TIME: 13.7 SEC (ref 11.9–14.6)
RBC # BLD AUTO: 3.71 M/UL (ref 3.8–5.2)
RBC # CSF: 0 /CU MM
THERAPEUTIC RANGE,PTTT: NORMAL SEC (ref 82–109)
TUBE # CSF: 1
TUBE # CSF: 1
TUBE # CSF: 2
WBC # BLD AUTO: 7.7 K/UL (ref 3.6–11)
WBC # CSF: 1 /CU MM (ref 0–5)

## 2023-01-16 PROCEDURE — 62270 DX LMBR SPI PNXR: CPT

## 2023-01-16 PROCEDURE — 85730 THROMBOPLASTIN TIME PARTIAL: CPT

## 2023-01-16 PROCEDURE — 82945 GLUCOSE OTHER FLUID: CPT

## 2023-01-16 PROCEDURE — 36415 COLL VENOUS BLD VENIPUNCTURE: CPT

## 2023-01-16 PROCEDURE — 84157 ASSAY OF PROTEIN OTHER: CPT

## 2023-01-16 PROCEDURE — 85610 PROTHROMBIN TIME: CPT

## 2023-01-16 PROCEDURE — 86592 SYPHILIS TEST NON-TREP QUAL: CPT

## 2023-01-16 PROCEDURE — 89050 BODY FLUID CELL COUNT: CPT

## 2023-01-16 PROCEDURE — 85025 COMPLETE CBC W/AUTO DIFF WBC: CPT

## 2023-01-16 RX ORDER — IBUPROFEN 200 MG
800 TABLET ORAL
COMMUNITY

## 2023-01-16 NOTE — ROUTINE PROCESS
Pt recovered on unit. Vitals remained stable. Pt laid flat until 1515. AVS reviewed with pt and pt's mother, Tanna [place]. Both verbalized understanding. IV removed and pt discharged safely.

## 2023-01-16 NOTE — PERIOP NOTES
Spoke to Ravi Gambino in radiology to make aware pt took diclofenac 1/13/23 and ibuprofen likely 1/12/23 but unsure if she had it 1/14/23 or 1/15/23. Ravi Gambino spoke with radiologist, states ok to proceed. Made aware pt will be ready once IV is obtained.

## 2023-01-17 RX ORDER — ACETAZOLAMIDE 500 MG/1
CAPSULE, EXTENDED RELEASE ORAL
Qty: 60 CAPSULE | Refills: 4 | Status: SHIPPED | OUTPATIENT
Start: 2023-01-17

## 2023-01-18 NOTE — PROGRESS NOTES
Patient was contacted and advised that Dr. Mike Peacock sent medications to her pharmacy. Patient does have a follow up appointment set already.

## 2023-01-19 ENCOUNTER — TELEPHONE (OUTPATIENT)
Dept: NEUROLOGY | Age: 43
End: 2023-01-19

## 2023-01-20 LAB — REAGIN AB CSF QL: NON REACTIVE

## 2023-01-20 NOTE — TELEPHONE ENCOUNTER
Patient stated to find out the status of her getting her medication Aimovig? Patient lives an hour away if she has to pick some samples up.     Please contact

## 2023-01-22 ENCOUNTER — HOSPITAL ENCOUNTER (EMERGENCY)
Age: 43
Discharge: HOME | End: 2023-01-22
Attending: STUDENT IN AN ORGANIZED HEALTH CARE EDUCATION/TRAINING PROGRAM | Admitting: STUDENT IN AN ORGANIZED HEALTH CARE EDUCATION/TRAINING PROGRAM
Payer: COMMERCIAL

## 2023-01-22 VITALS
RESPIRATION RATE: 18 BRPM | HEIGHT: 66 IN | HEART RATE: 79 BPM | OXYGEN SATURATION: 100 % | DIASTOLIC BLOOD PRESSURE: 78 MMHG | SYSTOLIC BLOOD PRESSURE: 122 MMHG | WEIGHT: 220 LBS | TEMPERATURE: 98.2 F | BODY MASS INDEX: 35.36 KG/M2

## 2023-01-22 DIAGNOSIS — R51.9 CHRONIC NONINTRACTABLE HEADACHE, UNSPECIFIED HEADACHE TYPE: Primary | ICD-10-CM

## 2023-01-22 DIAGNOSIS — G89.29 CHRONIC NONINTRACTABLE HEADACHE, UNSPECIFIED HEADACHE TYPE: Primary | ICD-10-CM

## 2023-01-22 LAB
ALBUMIN SERPL-MCNC: 3.9 G/DL (ref 3.5–5)
ALBUMIN/GLOB SERPL: 1 (ref 1.1–2.2)
ALP SERPL-CCNC: 85 U/L (ref 45–117)
ALT SERPL-CCNC: 14 U/L (ref 12–78)
ANION GAP SERPL CALC-SCNC: 7 MMOL/L (ref 5–15)
AST SERPL W P-5'-P-CCNC: 7 U/L (ref 15–37)
BASOPHILS # BLD: 0.1 K/UL (ref 0–0.1)
BASOPHILS NFR BLD: 1 % (ref 0–1)
BILIRUB SERPL-MCNC: 0.3 MG/DL (ref 0.2–1)
BUN SERPL-MCNC: 14 MG/DL (ref 6–20)
BUN/CREAT SERPL: 16 (ref 12–20)
CA-I BLD-MCNC: 9.1 MG/DL (ref 8.5–10.1)
CHLORIDE SERPL-SCNC: 112 MMOL/L (ref 97–108)
CO2 SERPL-SCNC: 21 MMOL/L (ref 21–32)
CREAT SERPL-MCNC: 0.86 MG/DL (ref 0.55–1.02)
DIFFERENTIAL METHOD BLD: NORMAL
EOSINOPHIL # BLD: 0.1 K/UL (ref 0–0.4)
EOSINOPHIL NFR BLD: 1 % (ref 0–7)
ERYTHROCYTE [DISTWIDTH] IN BLOOD BY AUTOMATED COUNT: 13 % (ref 11.5–14.5)
GLOBULIN SER CALC-MCNC: 4 G/DL (ref 2–4)
GLUCOSE SERPL-MCNC: 98 MG/DL (ref 65–100)
HCT VFR BLD AUTO: 37.4 % (ref 35–47)
HGB BLD-MCNC: 12.1 G/DL (ref 11.5–16)
IMM GRANULOCYTES # BLD AUTO: 0 K/UL (ref 0–0.04)
IMM GRANULOCYTES NFR BLD AUTO: 0 % (ref 0–0.5)
INR PPP: 1 (ref 0.9–1.1)
LYMPHOCYTES # BLD: 1.9 K/UL (ref 0.8–3.5)
LYMPHOCYTES NFR BLD: 22 % (ref 12–49)
MCH RBC QN AUTO: 31.4 PG (ref 26–34)
MCHC RBC AUTO-ENTMCNC: 32.4 G/DL (ref 30–36.5)
MCV RBC AUTO: 97.1 FL (ref 80–99)
MONOCYTES # BLD: 0.5 K/UL (ref 0–1)
MONOCYTES NFR BLD: 6 % (ref 5–13)
NEUTS SEG # BLD: 5.9 K/UL (ref 1.8–8)
NEUTS SEG NFR BLD: 70 % (ref 32–75)
NRBC # BLD: 0 K/UL (ref 0–0.01)
NRBC BLD-RTO: 0 PER 100 WBC
PLATELET # BLD AUTO: 305 K/UL (ref 150–400)
PMV BLD AUTO: 9.8 FL (ref 8.9–12.9)
POTASSIUM SERPL-SCNC: 3.7 MMOL/L (ref 3.5–5.1)
PROT SERPL-MCNC: 7.9 G/DL (ref 6.4–8.2)
PROTHROMBIN TIME: 13.4 SEC (ref 11.9–14.6)
RBC # BLD AUTO: 3.85 M/UL (ref 3.8–5.2)
SODIUM SERPL-SCNC: 140 MMOL/L (ref 136–145)
WBC # BLD AUTO: 8.5 K/UL (ref 3.6–11)

## 2023-01-22 PROCEDURE — 36415 COLL VENOUS BLD VENIPUNCTURE: CPT

## 2023-01-22 PROCEDURE — 99284 EMERGENCY DEPT VISIT MOD MDM: CPT | Performed by: STUDENT IN AN ORGANIZED HEALTH CARE EDUCATION/TRAINING PROGRAM

## 2023-01-22 PROCEDURE — 74011250636 HC RX REV CODE- 250/636: Performed by: STUDENT IN AN ORGANIZED HEALTH CARE EDUCATION/TRAINING PROGRAM

## 2023-01-22 PROCEDURE — 80053 COMPREHEN METABOLIC PANEL: CPT

## 2023-01-22 PROCEDURE — 96374 THER/PROPH/DIAG INJ IV PUSH: CPT

## 2023-01-22 PROCEDURE — 85610 PROTHROMBIN TIME: CPT

## 2023-01-22 PROCEDURE — 96375 TX/PRO/DX INJ NEW DRUG ADDON: CPT

## 2023-01-22 PROCEDURE — 96374 THER/PROPH/DIAG INJ IV PUSH: CPT | Performed by: STUDENT IN AN ORGANIZED HEALTH CARE EDUCATION/TRAINING PROGRAM

## 2023-01-22 PROCEDURE — 85025 COMPLETE CBC W/AUTO DIFF WBC: CPT

## 2023-01-22 PROCEDURE — 96375 TX/PRO/DX INJ NEW DRUG ADDON: CPT | Performed by: STUDENT IN AN ORGANIZED HEALTH CARE EDUCATION/TRAINING PROGRAM

## 2023-01-22 RX ORDER — MAGNESIUM SULFATE HEPTAHYDRATE 40 MG/ML
2 INJECTION, SOLUTION INTRAVENOUS
Status: COMPLETED | OUTPATIENT
Start: 2023-01-22 | End: 2023-01-22

## 2023-01-22 RX ORDER — DIPHENHYDRAMINE HYDROCHLORIDE 50 MG/ML
25 INJECTION, SOLUTION INTRAMUSCULAR; INTRAVENOUS
Status: COMPLETED | OUTPATIENT
Start: 2023-01-22 | End: 2023-01-22

## 2023-01-22 RX ORDER — METOCLOPRAMIDE 10 MG/1
10 TABLET ORAL
Qty: 12 TABLET | Refills: 0 | Status: SHIPPED | OUTPATIENT
Start: 2023-01-22 | End: 2023-02-01

## 2023-01-22 RX ORDER — KETOROLAC TROMETHAMINE 30 MG/ML
15 INJECTION, SOLUTION INTRAMUSCULAR; INTRAVENOUS
Status: COMPLETED | OUTPATIENT
Start: 2023-01-22 | End: 2023-01-22

## 2023-01-22 RX ORDER — METOCLOPRAMIDE HYDROCHLORIDE 5 MG/ML
10 INJECTION INTRAMUSCULAR; INTRAVENOUS
Status: COMPLETED | OUTPATIENT
Start: 2023-01-22 | End: 2023-01-22

## 2023-01-22 RX ADMIN — METOCLOPRAMIDE HYDROCHLORIDE 10 MG: 5 INJECTION INTRAMUSCULAR; INTRAVENOUS at 15:24

## 2023-01-22 RX ADMIN — DIPHENHYDRAMINE HYDROCHLORIDE 25 MG: 50 INJECTION INTRAMUSCULAR; INTRAVENOUS at 15:24

## 2023-01-22 RX ADMIN — MAGNESIUM SULFATE HEPTAHYDRATE 2 G: 40 INJECTION, SOLUTION INTRAVENOUS at 15:24

## 2023-01-22 RX ADMIN — KETOROLAC TROMETHAMINE 15 MG: 30 INJECTION, SOLUTION INTRAMUSCULAR; INTRAVENOUS at 15:24

## 2023-01-22 NOTE — ED PROVIDER NOTES
[facility] EMERGENCY DEPT  EMERGENCY DEPARTMENT HISTORY AND PHYSICAL EXAM      Date: 2023  Patient Name: Madelin Tomlin  MRN: [id number]  [place]: 1980  Date of evaluation: 2023  Provider: Kwame Salazar MD   Note Started: 3:09 PM 23    HISTORY OF PRESENT ILLNESS     Chief Complaint   Patient presents with    Headache    Eye Pain    Neck Pain       History Provided By: Patient    HPI: Madelin Tomlin, 38 y.o. female history of chronic headache, recently diagnosed suspected papilledema, presents to the emergency department for headache, neck pain, worsening after had lumbar puncture completed proximately 5 days ago. Patient is seen by Dr. Juan Kaufman for headaches, had lumbar puncture scheduled for suspected increased intracranial pressures. Lumbar puncture completed without incident, however patient states since that time has complained of worsening headache, eye pain and neck pain. Denies any blurry vision double vision, no fevers chills, no pain redness or tenderness from puncture site no obvious clear fluid leakage from puncture site.     PAST MEDICAL HISTORY   Past Medical History:  Past Medical History:   Diagnosis Date    Headache     HSV-1 infection     Migraines        Past Surgical History:  Past Surgical History:   Procedure Laterality Date    HX TUBAL LIGATION         Family History:  Family History   Problem Relation Age of Onset    Ovarian Cancer Mother     Migraines Mother     Ovarian Cancer Sister     Migraines Sister        Social History:  Social History     Tobacco Use    Smoking status: Former     Packs/day: 1.00     Types: Cigarettes     Quit date: 2019     Years since quittin.0    Smokeless tobacco: Never   Substance Use Topics    Alcohol use: Yes     Comment: weekends    Drug use: Never       Allergies:  No Known Allergies    PCP: Manda Troy MD    Current Meds:   Discharge Medication List as of 2023  5:24 PM        CONTINUE these medications which have NOT CHANGED Details   acetaZOLAMIDE SR (DIAMOX) 500 mg capsule 1 p.o. twice daily  Indications: pseudotumor cerebri, a condition with high fluid pressure in the brain, Normal, Disp-60 Capsule, R-4      rizatriptan benzoate (RIZATRIPTAN PO) Take  by mouth three (3) times daily as needed., Historical Med      ibuprofen (MOTRIN) 200 mg tablet Take 800 mg by mouth every six (6) hours as needed for Pain., Historical Med      acetaminophen (TYLENOL PO) Take 1,000 mg by mouth every six (6) hours as needed., Historical Med      erenumab-aooe (Aimovig Autoinjector) 70 mg/mL injection 2 mL by SubCUTAneous route every thirty (30) days. , Normal, Disp-2 mL, R-4      valACYclovir (VALTREX) 500 mg tablet Take 1 Tablet by mouth daily. , Normal, Disp-30 Tablet, R-5      diclofenac EC (VOLTAREN) 50 mg EC tablet Take 50 mg by mouth as needed., Historical Med             REVIEW OF SYSTEMS   Review of Systems   Constitutional:  Negative for activity change, chills, fatigue and fever. HENT:  Negative for congestion and trouble swallowing. Eyes:  Negative for photophobia and visual disturbance. Respiratory:  Negative for cough, chest tightness and shortness of breath. Cardiovascular:  Negative for chest pain and palpitations. Gastrointestinal:  Negative for abdominal distention, abdominal pain, diarrhea, nausea and vomiting. Genitourinary:  Negative for dysuria, flank pain and frequency. Musculoskeletal:  Positive for neck pain. Negative for arthralgias, back pain and myalgias. Skin:  Negative for color change, pallor and rash. Neurological:  Positive for headaches. Negative for dizziness, tremors and weakness. Psychiatric/Behavioral:  Negative for confusion. Positives and Pertinent negatives as per HPI.     PHYSICAL EXAM     ED Triage Vitals [01/22/23 1355]   ED Encounter Vitals Group      BP (!) 142/81      Pulse (Heart Rate) 92      Resp Rate 18      Temp 98.2 °F (36.8 °C)      Temp src       O2 Sat (%) 100 %      Weight 220 lb      Height 5' 6\"      Physical Exam  Vitals and nursing note reviewed. Constitutional:       General: She is not in acute distress. Appearance: Normal appearance. She is normal weight. She is not toxic-appearing. HENT:      Head: Normocephalic and atraumatic. Nose: Nose normal.      Mouth/Throat:      Mouth: Mucous membranes are moist.      Pharynx: Oropharynx is clear. Eyes:      Extraocular Movements: Extraocular movements intact. Conjunctiva/sclera: Conjunctivae normal.      Pupils: Pupils are equal, round, and reactive to light. Cardiovascular:      Rate and Rhythm: Normal rate and regular rhythm. Pulses: Normal pulses. Pulmonary:      Effort: Pulmonary effort is normal.      Breath sounds: Normal breath sounds. Abdominal:      General: Abdomen is flat. Palpations: Abdomen is soft. Musculoskeletal:         General: No tenderness. Normal range of motion. Cervical back: Normal range of motion and neck supple. No rigidity. Lymphadenopathy:      Cervical: No cervical adenopathy. Skin:     General: Skin is warm and dry. Capillary Refill: Capillary refill takes less than 2 seconds. Coloration: Skin is not pale. Neurological:      General: No focal deficit present. Mental Status: She is alert and oriented to person, place, and time. Mental status is at baseline. Cranial Nerves: No cranial nerve deficit. Sensory: No sensory deficit. Motor: No weakness.       Coordination: Coordination normal.   Psychiatric:         Mood and Affect: Mood normal.         Behavior: Behavior normal.       SCREENINGS               No data recorded        LAB, EKG AND DIAGNOSTIC RESULTS   Labs:  Recent Results (from the past 12 hour(s))   CBC WITH AUTOMATED DIFF    Collection Time: 01/22/23  3:12 PM   Result Value Ref Range    WBC 8.5 3.6 - 11.0 K/uL    RBC 3.85 3.80 - 5.20 M/uL    HGB 12.1 11.5 - 16.0 g/dL    HCT 37.4 35.0 - 47.0 %    MCV 97.1 80.0 - 99.0 FL MCH 31.4 26.0 - 34.0 PG    MCHC 32.4 30.0 - 36.5 g/dL    RDW 13.0 11.5 - 14.5 %    PLATELET 157 952 - 958 K/uL    MPV 9.8 8.9 - 12.9 FL    NRBC 0.0 0.0  WBC    ABSOLUTE NRBC 0.00 0.00 - 0.01 K/uL    NEUTROPHILS 70 32 - 75 %    LYMPHOCYTES 22 12 - 49 %    MONOCYTES 6 5 - 13 %    EOSINOPHILS 1 0 - 7 %    BASOPHILS 1 0 - 1 %    IMMATURE GRANULOCYTES 0 0 - 0.5 %    ABS. NEUTROPHILS 5.9 1.8 - 8.0 K/UL    ABS. LYMPHOCYTES 1.9 0.8 - 3.5 K/UL    ABS. MONOCYTES 0.5 0.0 - 1.0 K/UL    ABS. EOSINOPHILS 0.1 0.0 - 0.4 K/UL    ABS. BASOPHILS 0.1 0.0 - 0.1 K/UL    ABS. IMM. GRANS. 0.0 0.00 - 0.04 K/UL    DF AUTOMATED     METABOLIC PANEL, COMPREHENSIVE    Collection Time: 01/22/23  3:12 PM   Result Value Ref Range    Sodium 140 136 - 145 mmol/L    Potassium 3.7 3.5 - 5.1 mmol/L    Chloride 112 (H) 97 - 108 mmol/L    CO2 21 21 - 32 mmol/L    Anion gap 7 5 - 15 mmol/L    Glucose 98 65 - 100 mg/dL    BUN 14 6 - 20 mg/dL    Creatinine 0.86 0.55 - 1.02 mg/dL    BUN/Creatinine ratio 16 12 - 20      eGFR >60 >60 ml/min/1.73m2    Calcium 9.1 8.5 - 10.1 mg/dL    Bilirubin, total 0.3 0.2 - 1.0 mg/dL    AST (SGOT) 7 (L) 15 - 37 U/L    ALT (SGPT) 14 12 - 78 U/L    Alk. phosphatase 85 45 - 117 U/L    Protein, total 7.9 6.4 - 8.2 g/dL    Albumin 3.9 3.5 - 5.0 g/dL    Globulin 4.0 2.0 - 4.0 g/dL    A-G Ratio 1.0 (L) 1.1 - 2.2     PROTHROMBIN TIME + INR    Collection Time: 01/22/23  3:12 PM   Result Value Ref Range    Prothrombin time 13.4 11.9 - 14.6 sec    INR 1.0 0.9 - 1.1             Radiologic Studies:  Non-plain film images such as CT, Ultrasound and MRI are read by the radiologist. Plain radiographic images are visualized and preliminarily interpreted by the ED Provider with the below findings:        Interpretation per the Radiologist below, if available at the time of this note:  No results found. PROCEDURES   Unless otherwise noted below, none.   Performed by: Hai Mcwilliams MD   Procedures      [facility]       ED COURSE and DIFFERENTIAL DIAGNOSIS/MDM   Vitals:    Vitals:    01/22/23 1355 01/22/23 1724   BP: (!) 142/81 122/78   Pulse: 92 79   Resp: 18 18   Temp: 98.2 °F (36.8 °C)    SpO2: 100% 100%   Weight: 99.8 kg (220 lb)    Height: 5' 6\" (1.676 m)         Patient was given the following medications:  Medications   ketorolac (TORADOL) injection 15 mg (15 mg IntraVENous Given 1/22/23 1524)   diphenhydrAMINE (BENADRYL) injection 25 mg (25 mg IntraVENous Given 1/22/23 1524)   metoclopramide HCl (REGLAN) injection 10 mg (10 mg IntraVENous Given 1/22/23 1524)   magnesium sulfate 2 g/50 ml IVPB (premix or compounded) (2 g IntraVENous New Bag 1/22/23 1524)       CONSULTS: (Who and What was discussed)  None     Chronic Conditions: Obesity, hypertension, chronic headaches  Social Determinants affecting Dx or Tx: None  Counseling:      Records Reviewed (source and summary of external notes): Nursing Notes, Old Medical Records, and Previous Radiology Studies    CC/HPI Summary, DDx, ED Course, and Reassessment: 46-year-old female history of chronic headaches, suspected increased intracranial pressures, underwent lumbar puncture approximately 6 days ago, presents emergency department for continued and worsening headache with neck pain. Physical exam shows well-appearing female no distress, stable vitals, no significant neck stiffness on exam, nonfocal neurological exam.    Differential diagnosis includes tension headache, chronic migraine headache, intracranial hemorrhage, post lumbar puncture headache, spinal fluid leak. Meningitis. Given unremarkable physical exam, normal neuro, and normal vitals do not suspect acute intracranial hemorrhage or meningitis. No indication for imaging at this time, will establish IV, treat headache with Toradol, Reglan, magnesium, Benadryl, if headache persist may contact anesthesiology for possible blood patch.     ED Course as of 01/23/23 [place] Jan 22, 2023   1702 Reassessed states that her headache has resolved. Patient is going to call her neurologist tomorrow for prompt follow-up, will provide patient with Reglan for any nausea management. Instructed to take her headache medication as prescribed if headache returns. [PZ]      ED Course User Index  [PZ] Ora Carey MD       Disposition Considerations (Tests not done, Shared Decision Making, Pt Expectation of Test or Treatment.):      FINAL IMPRESSION     1. Chronic nonintractable headache, unspecified headache type          DISPOSITION/PLAN   Discharged         PATIENT REFERRED TO:  Follow-up Information       Follow up With Specialties Details Why Contact Info    Ritchie Sadler MD Specialist Undefined, Neurology In 3 days As needed [address] [phone]                DISCHARGE MEDICATIONS:  Discharge Medication List as of 1/22/2023  5:24 PM        START taking these medications    Details   metoclopramide HCl (Reglan) 10 mg tablet Take 1 Tablet by mouth every six (6) hours as needed for Nausea for up to 10 days. , Normal, Disp-12 Tablet, R-0           CONTINUE these medications which have NOT CHANGED    Details   acetaZOLAMIDE SR (DIAMOX) 500 mg capsule 1 p.o. twice daily  Indications: pseudotumor cerebri, a condition with high fluid pressure in the brain, Normal, Disp-60 Capsule, R-4      rizatriptan benzoate (RIZATRIPTAN PO) Take  by mouth three (3) times daily as needed., Historical Med      ibuprofen (MOTRIN) 200 mg tablet Take 800 mg by mouth every six (6) hours as needed for Pain., Historical Med      acetaminophen (TYLENOL PO) Take 1,000 mg by mouth every six (6) hours as needed., Historical Med      erenumab-aooe (Aimovig Autoinjector) 70 mg/mL injection 2 mL by SubCUTAneous route every thirty (30) days. , Normal, Disp-2 mL, R-4      valACYclovir (VALTREX) 500 mg tablet Take 1 Tablet by mouth daily. , Normal, Disp-30 Tablet, R-5      diclofenac EC (VOLTAREN) 50 mg EC tablet Take 50 mg by mouth as needed., Historical Med               DISCONTINUED MEDICATIONS:  Discharge Medication List as of 1/22/2023  5:24 PM          I am the Primary Clinician of Record: Taylor Villafuerte MD (electronically signed)    (Please note that parts of this dictation were completed with voice recognition software. Quite often unanticipated grammatical, syntax, homophones, and other interpretive errors are inadvertently transcribed by the computer software. Please disregards these errors.  Please excuse any errors that have escaped final proofreading.) rolling walker (5 inch wheels)

## 2023-01-22 NOTE — ED TRIAGE NOTES
\"Had spinal tap done on Monday, starting a day or two after started having neck pain, eye pain and headaches\"

## 2023-01-25 ENCOUNTER — OFFICE VISIT (OUTPATIENT)
Dept: NEUROLOGY | Age: 43
End: 2023-01-25
Payer: COMMERCIAL

## 2023-01-25 VITALS
HEART RATE: 80 BPM | SYSTOLIC BLOOD PRESSURE: 112 MMHG | OXYGEN SATURATION: 100 % | DIASTOLIC BLOOD PRESSURE: 80 MMHG | RESPIRATION RATE: 18 BRPM

## 2023-01-25 DIAGNOSIS — G93.2 IIH (IDIOPATHIC INTRACRANIAL HYPERTENSION): Primary | ICD-10-CM

## 2023-01-25 PROCEDURE — 99214 OFFICE O/P EST MOD 30 MIN: CPT | Performed by: SPECIALIST

## 2023-01-25 RX ORDER — TOPIRAMATE 25 MG/1
TABLET ORAL
Qty: 60 TABLET | Refills: 1 | Status: SHIPPED | OUTPATIENT
Start: 2023-01-25

## 2023-01-25 NOTE — PATIENT INSTRUCTIONS
Patient history reviewed patient examined. Events noted and in light of those events will recommend the addition of Topamax which might be more user-friendly than the Diamox at least for now. We will start off on a low-dose and go from there. Reasonable dietary discretion would be advised and that would be helpful as well. We will see how this goes and by degrees looking for reduction in pressure and protect visual function. Will suggest a call back in 1 week to let me know how the new drug is going. This will allow me to  next dosing order.

## 2023-01-25 NOTE — LETTER
1/25/2023    Patient: Inessa Zaldivar   YOB: 1980   Date of Visit: 1/25/2023     Marcus Lugo, [address] [phone]  Via Fax: [phone]    Dear Marcus Lugo MD,      Thank you for referring Ms. Rima Samson to [facility] for evaluation. My notes for this consultation are attached. If you have questions, please do not hesitate to call me. I look forward to following your patient along with you.       Sincerely,    Malika Fine MD

## 2023-01-25 NOTE — PROGRESS NOTES
Neurology Consult      Subjective:      Mery Begum is a 38 y.o. female who comes in today with outpatient LP showing opening pressure of 34 cm of water and normal CSF. Ended up being put on Diamox 500 mg twice daily but unfortunately ended up in the ER with what was called a post spinal tap pain and discomfort including occipital nuchal discomfort etc.  Evaluation in the ER showed afebrile vital signs stable but no other discovery process. The patient continue the Diamox for a few more days and then when she had stopped that yesterday she felt the occipital nuchal discomfort go away? Not sure I understand this sequence and may be was all coincidental.  Nevertheless educated her on the significance of the elevated opening pressure and how we need to control that including prudent weight reduction. Will suggest the addition of Topamax 25 mg twice daily for the next week and call me back and let me know how things are going. If the coast is clear will suggest a reasonable increase at that time. She will need to transition to [address] [place] [address] neuro-ophthalmology because after mid April I will no longer be in practice. However they have an excellent staff and she will be in the best of hands. We will see her back in 2 months from this visit and hope things go smoothly. She was also encouraged to follow-up with her eye doctors at [address] [place] [address] eye [facility] so that they are up to speed on her visual capabilities etc.         Current Outpatient Medications   Medication Sig Dispense Refill    topiramate (TOPAMAX) 25 mg tablet 1 p.o. twice daily for initial titration  Indications: iih 60 Tablet 1    metoclopramide HCl (Reglan) 10 mg tablet Take 1 Tablet by mouth every six (6) hours as needed for Nausea for up to 10 days. 12 Tablet 0    rizatriptan benzoate (RIZATRIPTAN PO) Take  by mouth three (3) times daily as needed. ibuprofen (MOTRIN) 200 mg tablet Take 800 mg by mouth every six (6) hours as needed for Pain. acetaminophen (TYLENOL PO) Take 1,000 mg by mouth every six (6) hours as needed. erenumab-aooe (Aimovig Autoinjector) 70 mg/mL injection 2 mL by SubCUTAneous route every thirty (30) days. 2 mL 4    valACYclovir (VALTREX) 500 mg tablet Take 1 Tablet by mouth daily. 30 Tablet 5    diclofenac EC (VOLTAREN) 50 mg EC tablet Take 50 mg by mouth as needed. No Known Allergies  Past Medical History:   Diagnosis Date    Headache     HSV-1 infection     Migraines       Past Surgical History:   Procedure Laterality Date    HX TUBAL LIGATION        Social History     Socioeconomic History    Marital status:      Spouse name: Not on file    Number of children: Not on file    Years of education: Not on file    Highest education level: Not on file   Occupational History    Not on file   Tobacco Use    Smoking status: Former     Packs/day: 1.00     Types: Cigarettes     Quit date: 2019     Years since quittin.0    Smokeless tobacco: Never   Substance and Sexual Activity    Alcohol use: Yes     Comment: weekends    Drug use: Never    Sexual activity: Yes   Other Topics Concern    Not on file   Social History Narrative    Not on file     Social Determinants of Health     Financial Resource Strain: Not on file   Food Insecurity: Not on file   Transportation Needs: Not on file   Physical Activity: Not on file   Stress: Not on file   Social Connections: Not on file   Intimate Partner Violence: Not on file   Housing Stability: Not on file      Family History   Problem Relation Age of Onset    Ovarian Cancer Mother     Migraines Mother     Ovarian Cancer Sister     Migraines Sister       Visit Vitals  /80 (BP 1 Location: Right arm, BP Patient Position: Sitting)   Pulse 80   Resp 18   SpO2 100%        Review of Systems:   A comprehensive review of systems was negative except for that written in the HPI. Neuro Exam:     Appearance:   The patient is well developed, well nourished, provides a coherent history and is in no acute distress. Mental Status: Oriented to time, place and person. Mood and affect appropriate. Cranial Nerves:   Intact visual fields to static hand confrontation. Fundi are remarkable for circumpapillary swelling but no obscuration of vessels no hemorrhages exudate or spasm etc.SIVA, EOM's full, no nystagmus, no ptosis. Facial sensation is normal. Corneal reflexes are intact. Facial movement is symmetric. Hearing is normal bilaterally. Palate is midline with normal sternocleidomastoid and trapezius muscles are normal. Tongue is midline. Visual acuity near without correction 20/20 OU APD negative   Motor:  5/5 strength in upper and lower proximal and distal muscles. Normal bulk and tone. No fasciculations. Reflexes:   Deep tendon reflexes 2+/4 and symmetrical.   Sensory:   Normal to touch, pinprick and vibration. Gait:  Normal gait. Tremor:   No tremor noted. Cerebellar:  No cerebellar signs present. Neurovascular:  Normal heart sounds and regular rhythm, peripheral pulses intact, and no carotid bruits. Assessment:   Idiopathic intracranial hypertension. We will see if we can accomplish a similar means with a slower titration of the drug Topamax a drug with similar chemistries. We will start off with 25 mg twice daily and anticipate increases with time. Suggest a revisit in about 2 months. We talked also about the benefit of prudent weight reduction to help her cause. Encouraged to follow-up with her eye doctor at [address] [place] [address] eye [facility] to bring them up to speed on her visual performance and symptoms etc.      Plan:   Revisit in about 2 months.   Signed by :  Brandin De Luna MD

## 2023-02-07 ENCOUNTER — TELEPHONE (OUTPATIENT)
Dept: NEUROLOGY | Age: 43
End: 2023-02-07

## 2023-02-07 RX ORDER — TOPIRAMATE 50 MG/1
TABLET, FILM COATED ORAL
Qty: 60 TABLET | Refills: 3 | Status: SHIPPED | OUTPATIENT
Start: 2023-02-07

## 2023-02-07 NOTE — TELEPHONE ENCOUNTER
Patient would like a call from the nurse or doctor regarding her medication TOPOMAX she stated it is working great. She wanted to know can the mg be increased?     Please contact

## 2023-02-21 ENCOUNTER — TELEPHONE (OUTPATIENT)
Dept: NEUROLOGY | Age: 43
End: 2023-02-21

## 2023-02-21 NOTE — TELEPHONE ENCOUNTER
Patient requesting a call to discuss if she can take an over the counter medication for her allergies along with her medication (Topamax 50 mg tablet)    Please call to discuss.

## 2023-04-07 ENCOUNTER — OFFICE VISIT (OUTPATIENT)
Dept: NEUROLOGY | Age: 43
End: 2023-04-07

## 2023-04-12 PROBLEM — R51.9 HEADACHE: Status: ACTIVE | Noted: 2023-04-12

## 2023-04-12 PROBLEM — G43.909 MIGRAINES: Status: ACTIVE | Noted: 2023-04-12

## 2023-04-12 PROBLEM — B00.9 HSV-1 INFECTION: Status: ACTIVE | Noted: 2023-04-12

## 2023-04-22 DIAGNOSIS — G93.2 BENIGN INTRACRANIAL HYPERTENSION: Primary | ICD-10-CM

## 2023-05-15 ENCOUNTER — OFFICE VISIT (OUTPATIENT)
Facility: CLINIC | Age: 43
End: 2023-05-15
Payer: COMMERCIAL

## 2023-05-15 VITALS
WEIGHT: 208 LBS | RESPIRATION RATE: 18 BRPM | SYSTOLIC BLOOD PRESSURE: 127 MMHG | BODY MASS INDEX: 33.43 KG/M2 | HEIGHT: 66 IN | TEMPERATURE: 97.7 F | DIASTOLIC BLOOD PRESSURE: 90 MMHG | HEART RATE: 74 BPM | OXYGEN SATURATION: 97 %

## 2023-05-15 DIAGNOSIS — G43.809 OTHER MIGRAINE WITHOUT STATUS MIGRAINOSUS, NOT INTRACTABLE: Primary | ICD-10-CM

## 2023-05-15 DIAGNOSIS — L65.9 ALOPECIA OF SCALP: Chronic | ICD-10-CM

## 2023-05-15 DIAGNOSIS — Z13.220 SCREENING FOR LIPOID DISORDERS: ICD-10-CM

## 2023-05-15 PROCEDURE — 99214 OFFICE O/P EST MOD 30 MIN: CPT | Performed by: FAMILY MEDICINE

## 2023-05-15 RX ORDER — ALBUTEROL SULFATE 90 UG/1
AEROSOL, METERED RESPIRATORY (INHALATION)
COMMUNITY
Start: 2023-03-29

## 2023-05-15 RX ORDER — ERENUMAB-AOOE 140 MG/ML
INJECTION, SOLUTION SUBCUTANEOUS
COMMUNITY
Start: 2023-02-17

## 2023-05-15 RX ORDER — LIDOCAINE 50 MG/G
PATCH TOPICAL
COMMUNITY
Start: 2023-04-02

## 2023-05-15 RX ORDER — MULTIVIT-MIN/IRON FUM/FOLIC AC 7.5 MG-4
1 TABLET ORAL DAILY
Qty: 30 TABLET | Refills: 3 | Status: SHIPPED | OUTPATIENT
Start: 2023-05-15

## 2023-05-15 RX ORDER — MEFENAMIC ACID 250 MG/1
250 CAPSULE ORAL EVERY 8 HOURS PRN
Qty: 28 CAPSULE | Refills: 0 | Status: SHIPPED | OUTPATIENT
Start: 2023-05-15

## 2023-05-15 RX ORDER — IBUPROFEN 200 MG
800 TABLET ORAL EVERY 6 HOURS PRN
COMMUNITY
End: 2023-05-15

## 2023-05-15 RX ORDER — TOPIRAMATE 100 MG/1
100 TABLET, FILM COATED ORAL 2 TIMES DAILY
COMMUNITY
Start: 2023-05-04

## 2023-05-15 SDOH — ECONOMIC STABILITY: HOUSING INSECURITY
IN THE LAST 12 MONTHS, WAS THERE A TIME WHEN YOU DID NOT HAVE A STEADY PLACE TO SLEEP OR SLEPT IN A SHELTER (INCLUDING NOW)?: NO

## 2023-05-15 SDOH — ECONOMIC STABILITY: FOOD INSECURITY: WITHIN THE PAST 12 MONTHS, YOU WORRIED THAT YOUR FOOD WOULD RUN OUT BEFORE YOU GOT MONEY TO BUY MORE.: NEVER TRUE

## 2023-05-15 SDOH — ECONOMIC STABILITY: FOOD INSECURITY: WITHIN THE PAST 12 MONTHS, THE FOOD YOU BOUGHT JUST DIDN'T LAST AND YOU DIDN'T HAVE MONEY TO GET MORE.: NEVER TRUE

## 2023-05-15 SDOH — ECONOMIC STABILITY: INCOME INSECURITY: HOW HARD IS IT FOR YOU TO PAY FOR THE VERY BASICS LIKE FOOD, HOUSING, MEDICAL CARE, AND HEATING?: NOT HARD AT ALL

## 2023-05-15 ASSESSMENT — ENCOUNTER SYMPTOMS
CONSTIPATION: 0
BLOOD IN STOOL: 0
VOMITING: 0
DIARRHEA: 0
NAUSEA: 0

## 2023-05-15 NOTE — PROGRESS NOTES
1. Have you been to the ER, urgent care clinic since your last visit? Hospitalized since your last visit? No    2. Have you seen or consulted any other health care providers outside of the [address] [place] [address] since your last visit? Include any pap smears or colon screening.  No      Chief Complaint   Patient presents with    Annual Exam     BP (!) 145/86 (Site: Right Upper Arm, Position: Sitting, Cuff Size: Large Adult)   Pulse 74   Temp 97.7 °F (36.5 °C) (Temporal)   Resp 18   Ht 5' 6\" (1.676 m)   Wt 208 lb (94.3 kg)   SpO2 97%   BMI 33.57 kg/m²

## 2023-05-15 NOTE — PROGRESS NOTES
Lexy Dela Cruz (:  1980) is a 41 y.o. female,Established patient, here for evaluation of the following chief complaint(s): Annual Exam    AWV  not done in lue of medical problems below. ASSESSMENT/PLAN:  1. Other migraine without status migrainosus, not intractable  2. Screening for lipoid disorders    Alopecia Chronic . Recent flare up  Assoc with wearing celeste. Assoc symptom is tingling of scalp and >50 hair strands lost daily. Subjective   SUBJECTIVE/OBJECTIVE:  Migraines. Chronic  Assoc with onset of menses. Wonders about treatment medication management. Menses are regular. Review of Systems   Constitutional:  Negative for chills and fever. Gastrointestinal:  Negative for blood in stool, constipation, diarrhea, nausea and vomiting. Hematological:  Does not bruise/bleed easily. Objective   Physical Exam  Constitutional:       Appearance: Normal appearance. Pulmonary:      Effort: Pulmonary effort is normal. No respiratory distress. Musculoskeletal:      Right lower leg: No edema. Left lower leg: No edema. Skin:     General: Skin is warm. Findings: No rash. Comments: Scalp w/out scaling, redness, cresting   Neurological:      Mental Status: She is alert and oriented to person, place, and time. Gait: Gait normal.   Psychiatric:         Mood and Affect: Mood normal.         Behavior: Behavior normal.         Thought Content: Thought content normal.         Judgment: Judgment normal.              An electronic signature was used to authenticate this note.     --Jamey Rosado MD

## 2023-05-15 NOTE — PATIENT INSTRUCTIONS
Diagnosis(es) discussed to patient satisfaction. Walk 30 minutes after each meal.   Eat healthy with 5 serving (tennis ball size) of green veggies/fresh fruit. Drink enough water to void clear after first morning urine. DEISY diet. No salty foods. No sodas. No junk foods. No sugar. Discussed ow important healthy lifestyle really is to the body. RTC in 1 month for hair loss and Ponstel efficacy. May need Dermatologist. TSH was normal recently. Sinus Rinse twice a day. Gargle with salt water twice a day.

## 2023-06-21 ENCOUNTER — OFFICE VISIT (OUTPATIENT)
Facility: CLINIC | Age: 43
End: 2023-06-21
Payer: COMMERCIAL

## 2023-06-21 VITALS
TEMPERATURE: 97.8 F | OXYGEN SATURATION: 98 % | HEIGHT: 66 IN | SYSTOLIC BLOOD PRESSURE: 140 MMHG | BODY MASS INDEX: 33.91 KG/M2 | DIASTOLIC BLOOD PRESSURE: 80 MMHG | RESPIRATION RATE: 18 BRPM | HEART RATE: 74 BPM | WEIGHT: 211 LBS

## 2023-06-21 DIAGNOSIS — E04.9 ENLARGED THYROID GLAND: Primary | ICD-10-CM

## 2023-06-21 DIAGNOSIS — Z11.59 NEED FOR HEPATITIS C SCREENING TEST: ICD-10-CM

## 2023-06-21 DIAGNOSIS — R21 RASH AND OTHER NONSPECIFIC SKIN ERUPTION: ICD-10-CM

## 2023-06-21 DIAGNOSIS — Z11.4 SCREENING FOR HIV WITHOUT PRESENCE OF RISK FACTORS: ICD-10-CM

## 2023-06-21 DIAGNOSIS — G43.919 INTRACTABLE MIGRAINE WITHOUT STATUS MIGRAINOSUS, UNSPECIFIED MIGRAINE TYPE: ICD-10-CM

## 2023-06-21 PROCEDURE — 99213 OFFICE O/P EST LOW 20 MIN: CPT | Performed by: NURSE PRACTITIONER

## 2023-06-22 ENCOUNTER — TELEPHONE (OUTPATIENT)
Facility: CLINIC | Age: 43
End: 2023-06-22

## 2023-06-22 LAB
ALBUMIN SERPL-MCNC: 4.4 G/DL (ref 3.8–4.8)
ALBUMIN/GLOB SERPL: 1.6 {RATIO} (ref 1.2–2.2)
ALP SERPL-CCNC: 85 IU/L (ref 44–121)
ALT SERPL-CCNC: 15 IU/L (ref 0–32)
AST SERPL-CCNC: 17 IU/L (ref 0–40)
BASOPHILS # BLD AUTO: 0.1 X10E3/UL (ref 0–0.2)
BASOPHILS NFR BLD AUTO: 1 %
BILIRUB SERPL-MCNC: 0.2 MG/DL (ref 0–1.2)
BUN SERPL-MCNC: 8 MG/DL (ref 6–24)
BUN/CREAT SERPL: 9 (ref 9–23)
CALCIUM SERPL-MCNC: 9.3 MG/DL (ref 8.7–10.2)
CHLORIDE SERPL-SCNC: 110 MMOL/L (ref 96–106)
CHOLEST SERPL-MCNC: 182 MG/DL (ref 100–199)
CO2 SERPL-SCNC: 20 MMOL/L (ref 20–29)
CREAT SERPL-MCNC: 0.87 MG/DL (ref 0.57–1)
EGFRCR SERPLBLD CKD-EPI 2021: 86 ML/MIN/1.73
EOSINOPHIL # BLD AUTO: 0.1 X10E3/UL (ref 0–0.4)
EOSINOPHIL NFR BLD AUTO: 2 %
ERYTHROCYTE [DISTWIDTH] IN BLOOD BY AUTOMATED COUNT: 12.5 % (ref 11.7–15.4)
GLOBULIN SER CALC-MCNC: 2.7 G/DL (ref 1.5–4.5)
GLUCOSE SERPL-MCNC: 95 MG/DL (ref 70–99)
HCT VFR BLD AUTO: 37 % (ref 34–46.6)
HCV IGG SERPL QL IA: NON REACTIVE
HDLC SERPL-MCNC: 66 MG/DL
HGB BLD-MCNC: 12.1 G/DL (ref 11.1–15.9)
HIV 1+2 AB+HIV1 P24 AG SERPL QL IA: NON REACTIVE
IMM GRANULOCYTES # BLD AUTO: 0 X10E3/UL (ref 0–0.1)
IMM GRANULOCYTES NFR BLD AUTO: 0 %
LDLC SERPL CALC-MCNC: 101 MG/DL (ref 0–99)
LYMPHOCYTES # BLD AUTO: 2.5 X10E3/UL (ref 0.7–3.1)
LYMPHOCYTES NFR BLD AUTO: 38 %
MCH RBC QN AUTO: 31.6 PG (ref 26.6–33)
MCHC RBC AUTO-ENTMCNC: 32.7 G/DL (ref 31.5–35.7)
MCV RBC AUTO: 97 FL (ref 79–97)
MONOCYTES # BLD AUTO: 0.5 X10E3/UL (ref 0.1–0.9)
MONOCYTES NFR BLD AUTO: 7 %
NEUTROPHILS # BLD AUTO: 3.4 X10E3/UL (ref 1.4–7)
NEUTROPHILS NFR BLD AUTO: 52 %
PLATELET # BLD AUTO: 319 X10E3/UL (ref 150–450)
POTASSIUM SERPL-SCNC: 3.5 MMOL/L (ref 3.5–5.2)
PROT SERPL-MCNC: 7.1 G/DL (ref 6–8.5)
RBC # BLD AUTO: 3.83 X10E6/UL (ref 3.77–5.28)
SODIUM SERPL-SCNC: 143 MMOL/L (ref 134–144)
THYROPEROXIDASE AB SERPL-ACNC: 14 IU/ML (ref 0–34)
TRIGL SERPL-MCNC: 81 MG/DL (ref 0–149)
TSH SERPL DL<=0.005 MIU/L-ACNC: 0.59 UIU/ML (ref 0.45–4.5)
VLDLC SERPL CALC-MCNC: 15 MG/DL (ref 5–40)
WBC # BLD AUTO: 6.5 X10E3/UL (ref 3.4–10.8)

## 2023-07-05 ENCOUNTER — HOSPITAL ENCOUNTER (OUTPATIENT)
Facility: HOSPITAL | Age: 43
Discharge: HOME | End: 2023-07-08
Payer: COMMERCIAL

## 2023-07-05 ENCOUNTER — OFFICE VISIT (OUTPATIENT)
Facility: CLINIC | Age: 43
End: 2023-07-05
Payer: COMMERCIAL

## 2023-07-05 VITALS
TEMPERATURE: 97.7 F | SYSTOLIC BLOOD PRESSURE: 127 MMHG | OXYGEN SATURATION: 99 % | BODY MASS INDEX: 33.46 KG/M2 | HEART RATE: 72 BPM | HEIGHT: 66 IN | DIASTOLIC BLOOD PRESSURE: 86 MMHG | WEIGHT: 208.2 LBS

## 2023-07-05 DIAGNOSIS — E04.9 ENLARGED THYROID GLAND: Primary | ICD-10-CM

## 2023-07-05 DIAGNOSIS — L65.9 ALOPECIA OF SCALP: ICD-10-CM

## 2023-07-05 DIAGNOSIS — R21 RASH AND OTHER NONSPECIFIC SKIN ERUPTION: ICD-10-CM

## 2023-07-05 DIAGNOSIS — E04.9 ENLARGED THYROID GLAND: ICD-10-CM

## 2023-07-05 DIAGNOSIS — G43.919 INTRACTABLE MIGRAINE WITHOUT STATUS MIGRAINOSUS, UNSPECIFIED MIGRAINE TYPE: ICD-10-CM

## 2023-07-05 PROCEDURE — 76536 US EXAM OF HEAD AND NECK: CPT

## 2023-07-05 PROCEDURE — 99213 OFFICE O/P EST LOW 20 MIN: CPT | Performed by: NURSE PRACTITIONER

## 2023-07-07 NOTE — RESULT ENCOUNTER NOTE
Results have been reviewed and Small left lobe thyroid nodule. No overt malignant features. Meaning no apparent malignancy of the thyroid. We can do another ultrasound in 1 year.

## 2023-07-11 ENCOUNTER — TELEPHONE (OUTPATIENT)
Facility: CLINIC | Age: 43
End: 2023-07-11

## 2023-07-11 NOTE — TELEPHONE ENCOUNTER
I spoke with patient and advised of imaging results and provider recommendations. She stated understanding.

## 2023-07-11 NOTE — TELEPHONE ENCOUNTER
----- Message from [address], [place] - CNP sent at 7/7/2023  8:11 AM EDT -----  Results have been reviewed and Small left lobe thyroid nodule. No overt malignant features. Meaning no apparent malignancy of the thyroid. We can do another ultrasound in 1 year.

## 2023-09-27 ENCOUNTER — OFFICE VISIT (OUTPATIENT)
Facility: CLINIC | Age: 43
End: 2023-09-27
Payer: COMMERCIAL

## 2023-09-27 VITALS
WEIGHT: 213 LBS | SYSTOLIC BLOOD PRESSURE: 134 MMHG | RESPIRATION RATE: 18 BRPM | HEART RATE: 65 BPM | OXYGEN SATURATION: 99 % | BODY MASS INDEX: 34.23 KG/M2 | DIASTOLIC BLOOD PRESSURE: 85 MMHG | TEMPERATURE: 97.7 F | HEIGHT: 66 IN

## 2023-09-27 DIAGNOSIS — J06.9 VIRAL URI: Primary | ICD-10-CM

## 2023-09-27 PROCEDURE — 99213 OFFICE O/P EST LOW 20 MIN: CPT | Performed by: FAMILY MEDICINE

## 2023-09-27 RX ORDER — VALACYCLOVIR HYDROCHLORIDE 500 MG/1
500 TABLET, FILM COATED ORAL DAILY
COMMUNITY

## 2023-10-31 ENCOUNTER — OFFICE VISIT (OUTPATIENT)
Facility: CLINIC | Age: 43
End: 2023-10-31

## 2023-10-31 VITALS
OXYGEN SATURATION: 98 % | WEIGHT: 216 LBS | HEART RATE: 65 BPM | BODY MASS INDEX: 34.86 KG/M2 | DIASTOLIC BLOOD PRESSURE: 84 MMHG | SYSTOLIC BLOOD PRESSURE: 139 MMHG | TEMPERATURE: 97.5 F

## 2023-10-31 DIAGNOSIS — J01.00 ACUTE NON-RECURRENT MAXILLARY SINUSITIS: Primary | ICD-10-CM

## 2023-10-31 DIAGNOSIS — J02.9 SORE THROAT: ICD-10-CM

## 2023-10-31 DIAGNOSIS — R05.9 COUGH, UNSPECIFIED TYPE: ICD-10-CM

## 2023-10-31 LAB
INFLUENZA A ANTIGEN, POC: NEGATIVE
INFLUENZA B ANTIGEN, POC: NEGATIVE
STREP PYOGENES DNA, POC: NEGATIVE
VALID INTERNAL CONTROL, POC: NORMAL
VALID INTERNAL CONTROL, POC: NORMAL

## 2023-10-31 RX ORDER — FLUTICASONE PROPIONATE 50 MCG
2 SPRAY, SUSPENSION (ML) NASAL DAILY PRN
Qty: 48 G | Refills: 3 | Status: SHIPPED | OUTPATIENT
Start: 2023-10-31

## 2023-10-31 RX ORDER — GLYCERIN 0.25 %
1 DROPS OPHTHALMIC (EYE) PRN
Qty: 30 ML | Refills: 0 | Status: SHIPPED | OUTPATIENT
Start: 2023-10-31

## 2023-10-31 RX ORDER — PREDNISONE 10 MG/1
10 TABLET ORAL 2 TIMES DAILY
Qty: 10 TABLET | Refills: 0 | Status: SHIPPED | OUTPATIENT
Start: 2023-10-31 | End: 2023-11-05

## 2023-10-31 RX ORDER — KETOCONAZOLE 20 MG/G
CREAM TOPICAL
COMMUNITY
Start: 2023-10-31

## 2023-10-31 RX ORDER — FLUCONAZOLE 100 MG/1
TABLET ORAL
COMMUNITY
Start: 2023-10-31

## 2023-10-31 RX ORDER — FLUOCINOLONE ACETONIDE 0.11 MG/ML
OIL TOPICAL
COMMUNITY
Start: 2023-10-31

## 2023-10-31 RX ORDER — CICLOPIROX 1 G/100ML
SHAMPOO TOPICAL
COMMUNITY
Start: 2023-10-31

## 2023-10-31 NOTE — PROGRESS NOTES
Subjective  Chief Complaint   Patient presents with    Headache     Pt states she has has Sx for past 3days. Son Dx w/strep throat  on Friday 10/27/23    Cough    Pharyngitis    Ear Fullness     Pt states L side ear fullness    Facial Pain     HPI:  Migdalia Mccoy is a 41 y.o. female, established patient, who presents for an acute symptoms of cough and sore throat. Evaluation to date: none    Onset: 2-3 days ago  Duration: worse first two days. Characteristics: congestion, headache, cough (dry), sore throat, postnasal drainage, night - hot/cold flashes, slight body aches in legs, ear fullness. Neck soreness. Denies any fevers, chest pain, or shortness of breath. Aggravating factors: night worse. Reliving factors: being up and cold medicine. Treatment to date: tylenol cold medication. Relevant PMH: Son has strep and a granddaughter who has a viral infection.  Denies history of seasonal allergies    Results for orders placed or performed in visit on 10/31/23   AMB POC ALTON INFLUENZA A/B TEST   Result Value Ref Range    Valid Internal Control, POC y     Influenza A Antigen, POC Negative Negative    Influenza B Antigen, POC Negative Negative   AMB POC STREP GO A DIRECT, DNA PROBE   Result Value Ref Range    Valid Internal Control, POC y     Strep pyogenes DNA, POC Negative Negative         Past Medical History:   Diagnosis Date    Headache     HSV-1 infection     Hypertension     Migraines      Past Surgical History:   Procedure Laterality Date    TUBAL LIGATION       Social History     Socioeconomic History    Marital status:      Spouse name: Not on file    Number of children: Not on file    Years of education: Not on file    Highest education level: Not on file   Occupational History    Not on file   Tobacco Use    Smoking status: Former     Packs/day: 1     Types: Cigarettes     Quit date: 2019     Years since quittin.8    Smokeless tobacco: Never   Substance and Sexual Activity

## 2023-11-02 ENCOUNTER — TELEPHONE (OUTPATIENT)
Facility: CLINIC | Age: 43
End: 2023-11-02

## 2023-11-02 LAB — SARS-COV-2 RNA RESP QL NAA+PROBE: DETECTED

## 2023-11-02 NOTE — TELEPHONE ENCOUNTER
I called the number on file and it's been disconnected. I called to discuss   Covid results. I see that the patient has reviewed the results and recommendations through my chart.

## 2023-11-02 NOTE — TELEPHONE ENCOUNTER
----- Message from [facility], NP-C sent at 11/2/2023  8:42 AM EDT -----  Good morning,     You did come back positive for covid 19. Currently, the CDC says to isolate for 5 days. If symptoms have improved after day 5 and you have not had fevers for 24 hours, you are able to return back to normal life. Again, this is all symptom management for the most part, but if symptoms do not improve, please let me know.      [employer]

## 2023-11-02 NOTE — RESULT ENCOUNTER NOTE
Good morning,     You did come back positive for covid 19. Currently, the CDC says to isolate for 5 days. If symptoms have improved after day 5 and you have not had fevers for 24 hours, you are able to return back to normal life. Again, this is all symptom management for the most part, but if symptoms do not improve, please let me know.      [employer]

## 2024-01-12 ENCOUNTER — OFFICE VISIT (OUTPATIENT)
Facility: CLINIC | Age: 44
End: 2024-01-12
Payer: COMMERCIAL

## 2024-01-12 VITALS
DIASTOLIC BLOOD PRESSURE: 88 MMHG | OXYGEN SATURATION: 99 % | WEIGHT: 221 LBS | HEART RATE: 81 BPM | RESPIRATION RATE: 18 BRPM | HEIGHT: 66 IN | BODY MASS INDEX: 35.52 KG/M2 | TEMPERATURE: 97.8 F | SYSTOLIC BLOOD PRESSURE: 136 MMHG

## 2024-01-12 DIAGNOSIS — R73.9 CHRONIC HYPERGLYCEMIA: ICD-10-CM

## 2024-01-12 DIAGNOSIS — Z00.00 ANNUAL PHYSICAL EXAM: Primary | ICD-10-CM

## 2024-01-12 DIAGNOSIS — Z12.39 SCREENING BREAST EXAMINATION: Primary | ICD-10-CM

## 2024-01-12 DIAGNOSIS — G43.809 OTHER MIGRAINE WITHOUT STATUS MIGRAINOSUS, NOT INTRACTABLE: ICD-10-CM

## 2024-01-12 PROCEDURE — 99396 PREV VISIT EST AGE 40-64: CPT | Performed by: NURSE PRACTITIONER

## 2024-01-12 RX ORDER — TIRZEPATIDE 2.5 MG/.5ML
0.5 INJECTION, SOLUTION SUBCUTANEOUS
Qty: 2 ML | Refills: 0 | Status: SHIPPED | OUTPATIENT
Start: 2024-01-12 | End: 2024-01-25

## 2024-01-12 RX ORDER — TIRZEPATIDE 5 MG/.5ML
0.5 INJECTION, SOLUTION SUBCUTANEOUS
Qty: 2 ML | Refills: 0 | Status: SHIPPED | OUTPATIENT
Start: 2024-01-12 | End: 2024-01-25

## 2024-01-12 RX ORDER — TIRZEPATIDE 7.5 MG/.5ML
0.5 INJECTION, SOLUTION SUBCUTANEOUS
Qty: 2 ML | Refills: 0 | Status: SHIPPED | OUTPATIENT
Start: 2024-01-12 | End: 2024-01-25

## 2024-01-12 ASSESSMENT — PATIENT HEALTH QUESTIONNAIRE - PHQ9
1. LITTLE INTEREST OR PLEASURE IN DOING THINGS: 0
SUM OF ALL RESPONSES TO PHQ QUESTIONS 1-9: 0
2. FEELING DOWN, DEPRESSED OR HOPELESS: 0
SUM OF ALL RESPONSES TO PHQ QUESTIONS 1-9: 0
SUM OF ALL RESPONSES TO PHQ QUESTIONS 1-9: 0
SUM OF ALL RESPONSES TO PHQ9 QUESTIONS 1 & 2: 0
SUM OF ALL RESPONSES TO PHQ QUESTIONS 1-9: 0

## 2024-01-12 NOTE — PROGRESS NOTES
Subjective:   Ophelia Au is a 43 y.o. female  established here with complaints of Follow-up  Headache  Patient presents for evaluation of headache.  Reports neuro told help topamax for the migraines takes BID and manageable, still coming and going regularly but perhaps not as severe. Symptoms began about several months ago. Generally, the headaches last about a few days and occur once per month. The headaches do not seem to be related to any time of day or year. The headaches are usually dull, pounding, and throbbing and are located in temporal.  The patient rates her most severe headaches a 10 on a scale from 1 to 10. Recently, the headaches have been stable. Work attendance or other daily activities are affected by the headaches. Precipitating factors include: menses, stress, and weather changes. The headaches are usually not preceded by an aura. Associated neurologic symptoms: decreased physical activity and worsening school/work performance. The patient denies depression, dizziness, loss of balance, muscle weakness, and vomiting in the early morning. Home treatment has included acetaminophen and ibuprofen and fioricet with marked improvement. Other history includes: migraine headaches diagnosed in the past. Family history includes no known family members with significant headaches.      Patient Active Problem List   Diagnosis    Severe obesity (HCC)    IIH (idiopathic intracranial hypertension)    Headache    HSV-1 infection    Migraines    Alopecia of scalp      Current Outpatient Medications   Medication Sig Dispense Refill    Ciclopirox 1 % SHAM       fluocinolone (DERMA-SMOOTHE) 0.01 % OIL external oil       ketoconazole (NIZORAL) 2 % cream       fluticasone (FLONASE ALLERGY RELIEF) 50 MCG/ACT nasal spray 2 sprays by Each Nostril route daily as needed for Rhinitis 48 g 3    valACYclovir (VALTREX) 500 MG tablet Take 1 tablet by mouth daily      albuterol sulfate HFA (PROVENTIL;VENTOLIN;PROAIR) 108 (90

## 2024-01-12 NOTE — PROGRESS NOTES
1. Have you been to the ER, urgent care clinic since your last visit?  Hospitalized since your last visit?No    2. Have you seen or consulted any other health care providers outside of the [facility] since your last visit?  Include any pap smears or colon screening. No    Chief Complaint   Patient presents with    Follow-up     /88 (Site: Right Upper Arm, Position: Sitting, Cuff Size: Large Adult)   Pulse 81   Temp 97.8 °F (36.6 °C) (Temporal)   Resp 18   Ht 1.676 m (5' 6\")   Wt 100.2 kg (221 lb)   SpO2 99%   BMI 35.67 kg/m²

## 2024-01-16 ENCOUNTER — TELEPHONE (OUTPATIENT)
Facility: CLINIC | Age: 44
End: 2024-01-16

## 2024-01-16 NOTE — TELEPHONE ENCOUNTER
Patient called to notify office she just found out today the insurance will not cover the Zepbound.  Patient would like to know if there is any other medication she can try.  Please call patient at [phone].  Thank you

## 2024-01-17 NOTE — TELEPHONE ENCOUNTER
Pt is to call insurance and ask if Wegovy, Ozempic, Mounjaro, Saxenda are approved, and if so, we could try one of those. If not we will explore other meds instead.

## 2024-01-17 NOTE — TELEPHONE ENCOUNTER
Patient called and was notified of last note by Yohana.  Patient will call office back once insurance has been contacted and the out come.

## 2024-01-25 ENCOUNTER — OFFICE VISIT (OUTPATIENT)
Facility: CLINIC | Age: 44
End: 2024-01-25
Payer: COMMERCIAL

## 2024-01-25 VITALS
HEIGHT: 66 IN | OXYGEN SATURATION: 100 % | WEIGHT: 223 LBS | HEART RATE: 71 BPM | SYSTOLIC BLOOD PRESSURE: 128 MMHG | RESPIRATION RATE: 18 BRPM | DIASTOLIC BLOOD PRESSURE: 92 MMHG | BODY MASS INDEX: 35.84 KG/M2 | TEMPERATURE: 97.8 F

## 2024-01-25 DIAGNOSIS — Z76.89 ENCOUNTER FOR WEIGHT MANAGEMENT: ICD-10-CM

## 2024-01-25 DIAGNOSIS — R42 DIZZINESS: Primary | ICD-10-CM

## 2024-01-25 DIAGNOSIS — G43.811 OTHER MIGRAINE WITH STATUS MIGRAINOSUS, INTRACTABLE: ICD-10-CM

## 2024-01-25 PROCEDURE — 99214 OFFICE O/P EST MOD 30 MIN: CPT

## 2024-01-25 RX ORDER — BUTALBITAL, ACETAMINOPHEN AND CAFFEINE 50; 325; 40 MG/1; MG/1; MG/1
1 TABLET ORAL EVERY 4 HOURS PRN
Qty: 60 TABLET | Refills: 0 | Status: SHIPPED | OUTPATIENT
Start: 2024-01-25

## 2024-01-25 NOTE — PROGRESS NOTES
1. Have you been to the ER, urgent care clinic since your last visit?  Hospitalized since your last visit?No    2. Have you seen or consulted any other health care providers outside of the [facility] since your last visit?  Include any pap smears or colon screening. No    Chief Complaint   Patient presents with    Dizziness   .  BP (!) 146/100 (Site: Right Upper Arm, Position: Sitting, Cuff Size: Large Adult)   Pulse 71   Temp 97.8 °F (36.6 °C) (Temporal)   Resp 18   Ht 1.676 m (5' 6\")   Wt 101.2 kg (223 lb)   SpO2 100%   BMI 35.99 kg/m²

## 2024-01-25 NOTE — PROGRESS NOTES
Subjective  Chief Complaint   Patient presents with    Dizziness     HPI:  Ophleia Au is a 43 y.o. female, established patient, who presents for dizziness and headaches X2 days.     Evaluation to date: none    Onset: 2 days   Location: left front and posterior of head  Duration: constant  Characteristics: dull ache, denies current nausea and vomiting, slight sensitivity to light or sound and smells. Denies any blurred vision, motor, speech, or sensation changes.   Treatment to date: topamax throughout the day, but not helping, resting has helped in the past  Relevant PMH: Neurologist - idiopathic intracranial hypertension - , migraines. Work schedule had changed last week, which may be throwing off. Weather has also changed. States diet has also been off since work schedule has been sporadic this past week.     Past Medical History:   Diagnosis Date    Headache     HSV-1 infection     Hypertension     Migraines      Past Surgical History:   Procedure Laterality Date    TUBAL LIGATION       Social History     Socioeconomic History    Marital status:      Spouse name: Not on file    Number of children: Not on file    Years of education: Not on file    Highest education level: Not on file   Occupational History    Not on file   Tobacco Use    Smoking status: Former     Current packs/day: 0.00     Types: Cigarettes     Quit date: 2019     Years since quittin.0    Smokeless tobacco: Never   Substance and Sexual Activity    Alcohol use: Yes    Drug use: Never    Sexual activity: Yes   Other Topics Concern    Not on file   Social History Narrative    Not on file     Social Determinants of Health     Financial Resource Strain: Low Risk  (5/15/2023)    Overall Financial Resource Strain (CARDIA)     Difficulty of Paying Living Expenses: Not hard at all   Food Insecurity: Not on file (5/15/2023)   Transportation Needs: Unknown (5/15/2023)    PRAPARE - Transportation     Lack of Transportation

## 2024-02-02 ENCOUNTER — TELEPHONE (OUTPATIENT)
Age: 44
End: 2024-02-02

## 2024-02-02 NOTE — TELEPHONE ENCOUNTER
Called patient regarding referral to our [facility]. Patient did not answer so I left a vmail with our contact information.

## 2024-02-14 ENCOUNTER — TELEPHONE (OUTPATIENT)
Age: 44
End: 2024-02-14

## 2024-02-14 NOTE — TELEPHONE ENCOUNTER
Called patient regarding referral to our [facility]. Patient did not answer so I left a vmail with our contact information.

## 2024-02-23 ENCOUNTER — TELEPHONE (OUTPATIENT)
Age: 44
End: 2024-02-23

## 2024-02-23 NOTE — TELEPHONE ENCOUNTER
Called patient regarding referral to our [facility]. Patient did not answer so I left a vmail with our contact information.

## 2024-03-23 ENCOUNTER — HOSPITAL ENCOUNTER (EMERGENCY)
Facility: HOSPITAL | Age: 44
Discharge: HOME | End: 2024-03-23
Attending: FAMILY MEDICINE
Payer: COMMERCIAL

## 2024-03-23 VITALS
TEMPERATURE: 98.4 F | RESPIRATION RATE: 17 BRPM | HEART RATE: 73 BPM | SYSTOLIC BLOOD PRESSURE: 160 MMHG | WEIGHT: 221 LBS | BODY MASS INDEX: 35.52 KG/M2 | DIASTOLIC BLOOD PRESSURE: 90 MMHG | HEIGHT: 66 IN | OXYGEN SATURATION: 99 %

## 2024-03-23 DIAGNOSIS — J02.9 SORE THROAT: Primary | ICD-10-CM

## 2024-03-23 PROCEDURE — 99283 EMERGENCY DEPT VISIT LOW MDM: CPT

## 2024-03-23 RX ORDER — AMOXICILLIN 500 MG/1
500 TABLET, FILM COATED ORAL 2 TIMES DAILY
Qty: 20 TABLET | Refills: 0 | Status: SHIPPED | OUTPATIENT
Start: 2024-03-23 | End: 2024-04-02

## 2024-03-23 ASSESSMENT — LIFESTYLE VARIABLES
HOW MANY STANDARD DRINKS CONTAINING ALCOHOL DO YOU HAVE ON A TYPICAL DAY: 1 OR 2
HOW OFTEN DO YOU HAVE A DRINK CONTAINING ALCOHOL: MONTHLY OR LESS

## 2024-03-23 ASSESSMENT — PAIN - FUNCTIONAL ASSESSMENT: PAIN_FUNCTIONAL_ASSESSMENT: 0-10

## 2024-03-23 ASSESSMENT — PAIN SCALES - GENERAL: PAINLEVEL_OUTOF10: 7

## 2024-03-23 NOTE — ED PROVIDER NOTES
EMERGENCY DEPARTMENT HISTORY AND PHYSICAL EXAM      Date: 3/23/2024  Patient Name: Ophelia Au    History of Presenting Illness         History Provided By:     HPI: Ophelia Au, is a very pleasant 44 y.o. female presenting to the ED with a chief complaint of sore throat.  No radiation of pain.  Recent onset of symptoms..  No difficulty swallowing.  Tolerating secretions with ease.  No anterior neck pain nor swelling.  No changes in voice.  No fevers, chills nor rigors.  No alleviating factors.  Denies any other associated symptoms other than achy ears.    There are no other complaints, changes, or physical findings at this time.    PCP: Yohana Au, APRN - CNP    No current facility-administered medications on file prior to encounter.     Current Outpatient Medications on File Prior to Encounter   Medication Sig Dispense Refill    butalbital-acetaminophen-caffeine (FIORICET, ESGIC) -40 MG per tablet Take 1 tablet by mouth every 4 hours as needed for Headaches 60 tablet 0    Ciclopirox 1 % SHAM       fluocinolone (DERMA-SMOOTHE) 0.01 % OIL external oil       ketoconazole (NIZORAL) 2 % cream       fluticasone (FLONASE ALLERGY RELIEF) 50 MCG/ACT nasal spray 2 sprays by Each Nostril route daily as needed for Rhinitis 48 g 3    valACYclovir (VALTREX) 500 MG tablet Take 1 tablet by mouth daily      albuterol sulfate HFA (PROVENTIL;VENTOLIN;PROAIR) 108 (90 Base) MCG/ACT inhaler       topiramate (TOPAMAX) 100 MG tablet Take 0.5 tablets by mouth 2 times daily      Multiple Vitamins-Minerals (MULTIVITAMIN WITH MINERALS) tablet Take 1 tablet by mouth daily 30 tablet 3       Past History     Past Medical History:  Past Medical History:   Diagnosis Date    Headache     HSV-1 infection     Hypertension     Migraines        Past Surgical History:  Past Surgical History:   Procedure Laterality Date    TUBAL LIGATION         Family History:  Family History   Problem Relation Age of Onset    Ovarian  Status:  alert and oriented to person, place, and time.      Gait: Gait normal.   Psychiatric:         Mood and Affect: Mood normal.         Behavior: Behavior normal.   EARS: Tympanic membrane is nonbulging, nonerythematous, external auditory canal normal    Lab and Diagnostic Study Results     Labs -   No results found for this or any previous visit (from the past 12 hour(s)).    Radiologic Studies -   @lastxrresult@  [unfilled]  [unfilled]      Medical Decision Making   - I am the first provider for this patient.  - I reviewed the vital signs, available nursing notes, past medical history, past surgical history, family history and social history.  - Initial assessment performed. The patients presenting problems have been discussed, and they are in agreement with the care plan formulated and outlined with them.  I have encouraged them to ask questions as they arise throughout their visit.    Vital Signs-Reviewed the patient's vital signs.  Vitals:    03/23/24 1238   BP: (!) 160/90   Pulse: 73   Resp: 17   Temp: 98.4 °F (36.9 °C)   TempSrc: Oral   SpO2: 99%   Weight: 100.2 kg (221 lb)   Height: 1.676 m (5' 6\")       Nursing documentation reviewed  ED Course/ Provider Notes (Medical Decision Making):     Patient presented to the emergency department with a chief complaint of sore throat.  On examination the patient is nontoxic and well-appearing.  Vitals were reviewed per above. No findings consistent with peritonsillar abscess, epiglottitis nor airway obstruction.    Through shared decision making, will treat empirically for suspected strep pharyngitis.      Ophelia Au was given a thorough list of signs and symptoms that would warrant an immediate return to the emergency department. Otherwise Kehindejolenemuriel Au will follow up with PCP.       Procedures   Medical Decision Makingedical Decision Making  Performed by: Andrew Goncalves,   Procedures  None       Disposition   Disposition:     Home     All of the diagnostic

## 2024-03-27 ENCOUNTER — OFFICE VISIT (OUTPATIENT)
Facility: CLINIC | Age: 44
End: 2024-03-27
Payer: COMMERCIAL

## 2024-03-27 VITALS
DIASTOLIC BLOOD PRESSURE: 97 MMHG | HEART RATE: 82 BPM | WEIGHT: 225 LBS | HEIGHT: 66 IN | OXYGEN SATURATION: 97 % | SYSTOLIC BLOOD PRESSURE: 129 MMHG | BODY MASS INDEX: 36.16 KG/M2 | TEMPERATURE: 97.7 F

## 2024-03-27 DIAGNOSIS — J06.9 VIRAL URI: ICD-10-CM

## 2024-03-27 DIAGNOSIS — J02.9 SORE THROAT: ICD-10-CM

## 2024-03-27 DIAGNOSIS — M54.2 NECK PAIN: ICD-10-CM

## 2024-03-27 DIAGNOSIS — R05.1 ACUTE COUGH: Primary | ICD-10-CM

## 2024-03-27 LAB
EXP DATE SOLUTION: NORMAL
EXP DATE SWAB: NORMAL
EXPIRATION DATE: NORMAL
LOT NUMBER POC: NORMAL
LOT NUMBER SOLUTION: NORMAL
LOT NUMBER SWAB: NORMAL
SARS-COV-2 RNA, POC: NEGATIVE

## 2024-03-27 PROCEDURE — 87635 SARS-COV-2 COVID-19 AMP PRB: CPT | Performed by: STUDENT IN AN ORGANIZED HEALTH CARE EDUCATION/TRAINING PROGRAM

## 2024-03-27 PROCEDURE — 99214 OFFICE O/P EST MOD 30 MIN: CPT | Performed by: STUDENT IN AN ORGANIZED HEALTH CARE EDUCATION/TRAINING PROGRAM

## 2024-03-27 RX ORDER — BENZOCAINE/MENTH/CETYLPYRD CL 15 MG-2 MG
1 LOZENGE MUCOUS MEMBRANE EVERY 4 HOURS PRN
Qty: 18 LOZENGE | Refills: 0 | Status: SHIPPED | OUTPATIENT
Start: 2024-03-27

## 2024-03-27 RX ORDER — DEXTROMETHORPHAN HBR. AND GUAIFENESIN 10; 100 MG/5ML; MG/5ML
10 SOLUTION ORAL EVERY 4 HOURS PRN
Qty: 118 ML | Refills: 0 | Status: SHIPPED | OUTPATIENT
Start: 2024-03-27

## 2024-03-27 RX ORDER — BENZONATATE 100 MG/1
100 CAPSULE ORAL 3 TIMES DAILY PRN
Qty: 30 CAPSULE | Refills: 0 | Status: SHIPPED | OUTPATIENT
Start: 2024-03-27 | End: 2024-04-06

## 2024-03-27 RX ORDER — IBUPROFEN 800 MG/1
800 TABLET ORAL EVERY 6 HOURS PRN
Qty: 90 TABLET | Refills: 1 | Status: SHIPPED | OUTPATIENT
Start: 2024-03-27

## 2024-03-27 NOTE — PROGRESS NOTES
[facility]  Subjective       Chief Complaint   Patient presents with    Drainage    Congestion    Sinus Problem     HPI:  Ophelia Au is a 44 y.o. female.  New to provider  Acute visit    Symptom duration: started March 22 with a tonsil stone and pulled it off with a cutip    Ended up going to ED over the weekend.   Just started on AMOXICILLIN 500 mg BID (started taking on 3/24)    Now having green mucus, a lot of drainage in nose, pressure in face, neck ache, headache  Coughing, productive    Feeling like running low grade temperature at night.  Says she feels like when she had COVID    Not using any other medications:  Was using mohamud-seltzer allergy medication (antihistamine) but made her sleepy  Not using heading pad for neck but has one at home    Has a trip to [employer] next week    Past Medical History:   Diagnosis Date    Headache     HSV-1 infection     Hypertension     Migraines      Current Outpatient Medications   Medication Sig Dispense Refill    Benzocaine-Menthol (CEPACOL SORE THROAT) 10-2.1 MG LOZG Take 1 each by mouth every 4 hours as needed (sore throat) 18 lozenge 0    benzonatate (TESSALON) 100 MG capsule Take 1 capsule by mouth 3 times daily as needed for Cough 30 capsule 0    ibuprofen (ADVIL;MOTRIN) 800 MG tablet Take 1 tablet by mouth every 6 hours as needed for Pain 90 tablet 1    dextromethorphan-guaiFENesin (ROBITUSSIN-DM)  MG/5ML syrup Take 10 mLs by mouth every 4 hours as needed for Cough 118 mL 0    amoxicillin (AMOXIL) 500 MG tablet Take 1 tablet by mouth 2 times daily for 10 days 20 tablet 0    butalbital-acetaminophen-caffeine (FIORICET, ESGIC) -40 MG per tablet Take 1 tablet by mouth every 4 hours as needed for Headaches 60 tablet 0    Ciclopirox 1 % SHAM       fluocinolone (DERMA-SMOOTHE) 0.01 % OIL external oil       ketoconazole (NIZORAL) 2 % cream       fluticasone (FLONASE ALLERGY RELIEF) 50 MCG/ACT nasal spray 2 sprays by Each Nostril

## 2024-03-27 NOTE — PROGRESS NOTES
Chief Complaint   Patient presents with    Drainage    Congestion    Sinus Problem     \"Have you been to the ER, urgent care clinic since your last visit?  Hospitalized since your last visit?\"    YES - When: approximately 6 days ago.  Where and Why: sore throat.    “Have you seen or consulted any other health care providers outside of [facility] since your last visit?”    NO      BP (!) 129/97 (Site: Left Upper Arm, Position: Sitting, Cuff Size: Large Adult)   Pulse 82   Temp 97.7 °F (36.5 °C) (Temporal)   Ht 1.676 m (5' 6\")   Wt 102.1 kg (225 lb)   LMP 03/15/2024   SpO2 97%   BMI 36.32 kg/m²         Click Here for Release of Records Request

## 2024-04-01 ENCOUNTER — TELEPHONE (OUTPATIENT)
Age: 44
End: 2024-04-01

## 2024-04-01 DIAGNOSIS — B00.9 HERPES: Primary | ICD-10-CM

## 2024-04-01 RX ORDER — VALACYCLOVIR HYDROCHLORIDE 500 MG/1
500 TABLET, FILM COATED ORAL DAILY
Qty: 30 TABLET | Refills: 0 | Status: SHIPPED | OUTPATIENT
Start: 2024-04-01

## 2024-04-01 NOTE — TELEPHONE ENCOUNTER
Patient stated that she tried to get a refill for her valtrex but was told by pharmacy that it had to be authorized by provider. Patient was over due for an annual exam. Patient was schedule and refill sent to cover until appointment.

## 2024-04-27 DIAGNOSIS — B00.9 HERPES: ICD-10-CM

## 2024-04-28 RX ORDER — VALACYCLOVIR HYDROCHLORIDE 500 MG/1
500 TABLET, FILM COATED ORAL DAILY
Qty: 30 TABLET | Refills: 0 | Status: SHIPPED | OUTPATIENT
Start: 2024-04-28

## 2024-05-25 DIAGNOSIS — B00.9 HERPES: ICD-10-CM

## 2024-05-28 RX ORDER — VALACYCLOVIR HYDROCHLORIDE 500 MG/1
500 TABLET, FILM COATED ORAL DAILY
Qty: 30 TABLET | Refills: 0 | OUTPATIENT
Start: 2024-05-28

## 2024-06-10 ENCOUNTER — TELEPHONE (OUTPATIENT)
Age: 44
End: 2024-06-10

## 2024-06-10 DIAGNOSIS — B00.9 HERPES: ICD-10-CM

## 2024-06-10 RX ORDER — VALACYCLOVIR HYDROCHLORIDE 500 MG/1
500 TABLET, FILM COATED ORAL DAILY
Qty: 30 TABLET | Refills: 0 | Status: SHIPPED | OUTPATIENT
Start: 2024-06-10 | End: 2024-06-17 | Stop reason: SDUPTHER

## 2024-06-10 NOTE — TELEPHONE ENCOUNTER
Patient called to schedule her annual appointment with Dr. Oconnor and to get a refill of HSV medication. Medication was refilled for 30 days, patient was informed that further refills would be provided at her appointment.

## 2024-06-17 ENCOUNTER — OFFICE VISIT (OUTPATIENT)
Age: 44
End: 2024-06-17
Payer: COMMERCIAL

## 2024-06-17 VITALS
DIASTOLIC BLOOD PRESSURE: 109 MMHG | HEIGHT: 66 IN | RESPIRATION RATE: 16 BRPM | BODY MASS INDEX: 36.38 KG/M2 | SYSTOLIC BLOOD PRESSURE: 166 MMHG | HEART RATE: 60 BPM | OXYGEN SATURATION: 99 % | WEIGHT: 226.38 LBS | TEMPERATURE: 98 F

## 2024-06-17 DIAGNOSIS — Z12.4 CERVICAL CANCER SCREENING: ICD-10-CM

## 2024-06-17 DIAGNOSIS — Z01.419 ENCOUNTER FOR ANNUAL ROUTINE GYNECOLOGICAL EXAMINATION: Primary | ICD-10-CM

## 2024-06-17 DIAGNOSIS — Z12.31 ENCOUNTER FOR SCREENING MAMMOGRAM FOR MALIGNANT NEOPLASM OF BREAST: ICD-10-CM

## 2024-06-17 DIAGNOSIS — N92.0 MENORRHAGIA WITH REGULAR CYCLE: ICD-10-CM

## 2024-06-17 DIAGNOSIS — B00.9 HERPES: ICD-10-CM

## 2024-06-17 PROCEDURE — 99396 PREV VISIT EST AGE 40-64: CPT | Performed by: OBSTETRICS & GYNECOLOGY

## 2024-06-17 RX ORDER — MEDROXYPROGESTERONE ACETATE 10 MG/1
10 TABLET ORAL DAILY
Qty: 20 TABLET | Refills: 0 | Status: SHIPPED | OUTPATIENT
Start: 2024-06-17

## 2024-06-17 RX ORDER — VALACYCLOVIR HYDROCHLORIDE 500 MG/1
500 TABLET, FILM COATED ORAL DAILY
Qty: 30 TABLET | Refills: 4 | Status: SHIPPED | OUTPATIENT
Start: 2024-06-17

## 2024-06-17 NOTE — PROGRESS NOTES
Ophelia is a 44 y.o. female who presents today for the following:    Chief Complaint   Patient presents with    Annual Exam     C/O painful, heavy cycles        No Known Allergies       Current Outpatient Medications:     medroxyPROGESTERone (PROVERA) 10 MG tablet, Take 1 tablet by mouth daily, Disp: 20 tablet, Rfl: 0    valACYclovir (VALTREX) 500 MG tablet, Take 1 tablet by mouth daily, Disp: 30 tablet, Rfl: 4    albuterol sulfate HFA (PROVENTIL;VENTOLIN;PROAIR) 108 (90 Base) MCG/ACT inhaler, , Disp: , Rfl:     Benzocaine-Menthol (CEPACOL SORE THROAT) 10-2.1 MG LOZG, Take 1 each by mouth every 4 hours as needed (sore throat), Disp: 18 lozenge, Rfl: 0    ibuprofen (ADVIL;MOTRIN) 800 MG tablet, Take 1 tablet by mouth every 6 hours as needed for Pain, Disp: 90 tablet, Rfl: 1    dextromethorphan-guaiFENesin (ROBITUSSIN-DM)  MG/5ML syrup, Take 10 mLs by mouth every 4 hours as needed for Cough, Disp: 118 mL, Rfl: 0    butalbital-acetaminophen-caffeine (FIORICET, ESGIC) -40 MG per tablet, Take 1 tablet by mouth every 4 hours as needed for Headaches, Disp: 60 tablet, Rfl: 0    Ciclopirox 1 % SHAM, , Disp: , Rfl:     fluocinolone (DERMA-SMOOTHE) 0.01 % OIL external oil, , Disp: , Rfl:     ketoconazole (NIZORAL) 2 % cream, , Disp: , Rfl:     fluticasone (FLONASE ALLERGY RELIEF) 50 MCG/ACT nasal spray, 2 sprays by Each Nostril route daily as needed for Rhinitis, Disp: 48 g, Rfl: 3    topiramate (TOPAMAX) 100 MG tablet, Take 0.5 tablets by mouth 2 times daily, Disp: , Rfl:     Multiple Vitamins-Minerals (MULTIVITAMIN WITH MINERALS) tablet, Take 1 tablet by mouth daily, Disp: 30 tablet, Rfl: 3     Past Medical History:   Diagnosis Date    Headache     HSV-1 infection     Hypertension     Migraines         Past Surgical History:   Procedure Laterality Date    TUBAL LIGATION          Family History   Problem Relation Age of Onset    Ovarian Cancer Sister     Ovarian Cancer Mother     Migraines Sister     Migraines

## 2024-06-18 ENCOUNTER — TRANSCRIBE ORDERS (OUTPATIENT)
Facility: HOSPITAL | Age: 44
End: 2024-06-18

## 2024-06-18 DIAGNOSIS — Z12.31 VISIT FOR SCREENING MAMMOGRAM: Primary | ICD-10-CM

## 2024-06-19 LAB
., LABCORP: NORMAL
CYTOLOGIST CVX/VAG CYTO: NORMAL
CYTOLOGY CVX/VAG DOC CYTO: NORMAL
CYTOLOGY CVX/VAG DOC THIN PREP: NORMAL
DX ICD CODE: NORMAL
Lab: NORMAL
OTHER STN SPEC: NORMAL
STAT OF ADQ CVX/VAG CYTO-IMP: NORMAL

## 2024-06-24 ENCOUNTER — HOSPITAL ENCOUNTER (OUTPATIENT)
Facility: HOSPITAL | Age: 44
Discharge: HOME | End: 2024-06-27
Attending: OBSTETRICS & GYNECOLOGY
Payer: COMMERCIAL

## 2024-06-24 VITALS — WEIGHT: 226 LBS | BODY MASS INDEX: 36.32 KG/M2 | HEIGHT: 66 IN

## 2024-06-24 DIAGNOSIS — Z12.31 VISIT FOR SCREENING MAMMOGRAM: ICD-10-CM

## 2024-06-24 DIAGNOSIS — N92.0 MENORRHAGIA WITH REGULAR CYCLE: ICD-10-CM

## 2024-06-24 PROCEDURE — 77063 BREAST TOMOSYNTHESIS BI: CPT

## 2024-06-24 PROCEDURE — 76830 TRANSVAGINAL US NON-OB: CPT

## 2024-07-02 ENCOUNTER — TELEPHONE (OUTPATIENT)
Age: 44
End: 2024-07-02

## 2024-07-02 NOTE — TELEPHONE ENCOUNTER
Patient called to discuss her ultrasound results.  Advised her a [employer] message was sent to her asking that she contact the office to schedule an appointment to discuss results and treatment options.  Appointment scheduled for the patient to see Dr Oconnor on 07/11/24.

## 2024-07-11 ENCOUNTER — OFFICE VISIT (OUTPATIENT)
Age: 44
End: 2024-07-11
Payer: COMMERCIAL

## 2024-07-11 VITALS
DIASTOLIC BLOOD PRESSURE: 84 MMHG | BODY MASS INDEX: 36.5 KG/M2 | HEART RATE: 67 BPM | HEIGHT: 66 IN | OXYGEN SATURATION: 97 % | TEMPERATURE: 98.2 F | WEIGHT: 227.13 LBS | RESPIRATION RATE: 16 BRPM | SYSTOLIC BLOOD PRESSURE: 137 MMHG

## 2024-07-11 DIAGNOSIS — D25.1 INTRAMURAL LEIOMYOMA OF UTERUS: ICD-10-CM

## 2024-07-11 DIAGNOSIS — N92.0 MENORRHAGIA WITH REGULAR CYCLE: Primary | ICD-10-CM

## 2024-07-11 PROCEDURE — 99213 OFFICE O/P EST LOW 20 MIN: CPT | Performed by: OBSTETRICS & GYNECOLOGY

## 2024-07-11 RX ORDER — MEDROXYPROGESTERONE ACETATE 10 MG/1
10 TABLET ORAL DAILY
Qty: 30 TABLET | Refills: 3 | Status: SHIPPED | OUTPATIENT
Start: 2024-07-11

## 2024-07-11 SDOH — ECONOMIC STABILITY: FOOD INSECURITY: WITHIN THE PAST 12 MONTHS, YOU WORRIED THAT YOUR FOOD WOULD RUN OUT BEFORE YOU GOT MONEY TO BUY MORE.: NEVER TRUE

## 2024-07-11 SDOH — ECONOMIC STABILITY: FOOD INSECURITY: WITHIN THE PAST 12 MONTHS, THE FOOD YOU BOUGHT JUST DIDN'T LAST AND YOU DIDN'T HAVE MONEY TO GET MORE.: NEVER TRUE

## 2024-07-11 SDOH — ECONOMIC STABILITY: INCOME INSECURITY: HOW HARD IS IT FOR YOU TO PAY FOR THE VERY BASICS LIKE FOOD, HOUSING, MEDICAL CARE, AND HEATING?: NOT HARD AT ALL

## 2024-07-11 NOTE — PROGRESS NOTES
Ophelia is a 44 y.o. female who presents today for the following:    Chief Complaint   Patient presents with    Follow-up     Discuss ultrasound results and treatment options        No Known Allergies       Current Outpatient Medications:     medroxyPROGESTERone (PROVERA) 10 MG tablet, Take 1 tablet by mouth daily Take 1 tab daily for 10 days every month., Disp: 30 tablet, Rfl: 3    medroxyPROGESTERone (PROVERA) 10 MG tablet, Take 1 tablet by mouth daily, Disp: 20 tablet, Rfl: 0    valACYclovir (VALTREX) 500 MG tablet, Take 1 tablet by mouth daily, Disp: 30 tablet, Rfl: 4    Benzocaine-Menthol (CEPACOL SORE THROAT) 10-2.1 MG LOZG, Take 1 each by mouth every 4 hours as needed (sore throat), Disp: 18 lozenge, Rfl: 0    ibuprofen (ADVIL;MOTRIN) 800 MG tablet, Take 1 tablet by mouth every 6 hours as needed for Pain, Disp: 90 tablet, Rfl: 1    dextromethorphan-guaiFENesin (ROBITUSSIN-DM)  MG/5ML syrup, Take 10 mLs by mouth every 4 hours as needed for Cough, Disp: 118 mL, Rfl: 0    butalbital-acetaminophen-caffeine (FIORICET, ESGIC) -40 MG per tablet, Take 1 tablet by mouth every 4 hours as needed for Headaches, Disp: 60 tablet, Rfl: 0    Ciclopirox 1 % SHAM, , Disp: , Rfl:     fluocinolone (DERMA-SMOOTHE) 0.01 % OIL external oil, , Disp: , Rfl:     ketoconazole (NIZORAL) 2 % cream, , Disp: , Rfl:     fluticasone (FLONASE ALLERGY RELIEF) 50 MCG/ACT nasal spray, 2 sprays by Each Nostril route daily as needed for Rhinitis, Disp: 48 g, Rfl: 3    albuterol sulfate HFA (PROVENTIL;VENTOLIN;PROAIR) 108 (90 Base) MCG/ACT inhaler, , Disp: , Rfl:     topiramate (TOPAMAX) 100 MG tablet, Take 0.5 tablets by mouth 2 times daily, Disp: , Rfl:     Multiple Vitamins-Minerals (MULTIVITAMIN WITH MINERALS) tablet, Take 1 tablet by mouth daily, Disp: 30 tablet, Rfl: 3     Past Medical History:   Diagnosis Date    Headache     HSV-1 infection     Hypertension     Migraines         Past Surgical History:   Procedure Laterality Date

## 2024-07-11 NOTE — PROGRESS NOTES
Chief Complaint   Patient presents with    Follow-up     Discuss ultrasound results and treatment options       /84 (Site: Left Upper Arm, Position: Sitting, Cuff Size: Medium Adult)   Pulse 67   Temp 98.2 °F (36.8 °C)   Resp 16   Ht 1.676 m (5' 6\")   Wt 103 kg (227 lb 2 oz)   LMP 06/20/2024 (Approximate)   SpO2 97%   BMI 36.66 kg/m²       1. \"Have you been to the ER, urgent care clinic since your last visit?  Hospitalized since your last visit?\" No    2. \"Have you seen or consulted any other health care providers outside of the [facility] since your last visit?\" No     3. For patients aged 45-75: Has the patient had a colonoscopy / FIT/ Cologuard? NA - based on age      If the patient is female:    4. For patients aged 40-74: Has the patient had a mammogram within the past 2 years? Yes - no Care Gap present      5. For patients aged 21-65: Has the patient had a pap smear? Yes - no Care Gap present

## 2024-07-26 ENCOUNTER — OFFICE VISIT (OUTPATIENT)
Facility: CLINIC | Age: 44
End: 2024-07-26
Payer: COMMERCIAL

## 2024-07-26 DIAGNOSIS — G43.919 INTRACTABLE MIGRAINE WITHOUT STATUS MIGRAINOSUS, UNSPECIFIED MIGRAINE TYPE: Primary | ICD-10-CM

## 2024-07-26 DIAGNOSIS — R23.2 HOT FLASHES: ICD-10-CM

## 2024-07-26 PROCEDURE — 99214 OFFICE O/P EST MOD 30 MIN: CPT | Performed by: NURSE PRACTITIONER

## 2024-07-26 RX ORDER — CLONIDINE 0.1 MG/24H
1 PATCH, EXTENDED RELEASE TRANSDERMAL
Qty: 12 PATCH | Refills: 1 | Status: SHIPPED | OUTPATIENT
Start: 2024-07-26 | End: 2025-07-26

## 2024-07-26 RX ORDER — TOPIRAMATE 100 MG/1
50 TABLET, FILM COATED ORAL 2 TIMES DAILY
Qty: 60 TABLET | Refills: 5 | Status: SHIPPED | OUTPATIENT
Start: 2024-07-26

## 2024-07-26 ASSESSMENT — PATIENT HEALTH QUESTIONNAIRE - PHQ9
10. IF YOU CHECKED OFF ANY PROBLEMS, HOW DIFFICULT HAVE THESE PROBLEMS MADE IT FOR YOU TO DO YOUR WORK, TAKE CARE OF THINGS AT HOME, OR GET ALONG WITH OTHER PEOPLE: NOT DIFFICULT AT ALL
3. TROUBLE FALLING OR STAYING ASLEEP: MORE THAN HALF THE DAYS
8. MOVING OR SPEAKING SO SLOWLY THAT OTHER PEOPLE COULD HAVE NOTICED. OR THE OPPOSITE, BEING SO FIGETY OR RESTLESS THAT YOU HAVE BEEN MOVING AROUND A LOT MORE THAN USUAL: NOT AT ALL
1. LITTLE INTEREST OR PLEASURE IN DOING THINGS: NEARLY EVERY DAY
SUM OF ALL RESPONSES TO PHQ QUESTIONS 1-9: 12
SUM OF ALL RESPONSES TO PHQ QUESTIONS 1-9: 12
6. FEELING BAD ABOUT YOURSELF - OR THAT YOU ARE A FAILURE OR HAVE LET YOURSELF OR YOUR FAMILY DOWN: NOT AT ALL
5. POOR APPETITE OR OVEREATING: NEARLY EVERY DAY
4. FEELING TIRED OR HAVING LITTLE ENERGY: NEARLY EVERY DAY
SUM OF ALL RESPONSES TO PHQ9 QUESTIONS 1 & 2: 4
7. TROUBLE CONCENTRATING ON THINGS, SUCH AS READING THE NEWSPAPER OR WATCHING TELEVISION: NOT AT ALL
9. THOUGHTS THAT YOU WOULD BE BETTER OFF DEAD, OR OF HURTING YOURSELF: NOT AT ALL
SUM OF ALL RESPONSES TO PHQ QUESTIONS 1-9: 12
SUM OF ALL RESPONSES TO PHQ QUESTIONS 1-9: 12
2. FEELING DOWN, DEPRESSED OR HOPELESS: SEVERAL DAYS

## 2024-07-26 NOTE — PROGRESS NOTES
\"Have you been to the ER, urgent care clinic since your last visit?  Hospitalized since your last visit?\"    NO    “Have you seen or consulted any other health care providers outside of [facility] since your last visit?”    NO      Chief Complaint   Patient presents with    FMLA FORMS     BP (!) 146/96 (Site: Left Upper Arm, Position: Sitting, Cuff Size: Large Adult)   Pulse 76   Temp 97.7 °F (36.5 °C) (Temporal)   Resp 18   Ht 1.676 m (5' 6\")   Wt 103.4 kg (228 lb)   LMP 06/20/2024 (Approximate)   SpO2 100%   BMI 36.80 kg/m²

## 2024-07-26 NOTE — PROGRESS NOTES
[facility]  [address] [place] [address] [address]   [place] [address]  (P) [phone]   (F) [phone]    Subjective:   Ophelia Au is a 44 y.o. female  established here with complaints of FMLA FORMS   Reports hot flashes since started pills to manage menstrual cycles.  Most nights and days with excessive sweating and feeling uncomfortable.  Denies any mood swings.  Does report menstrual cycles regulated well with the medication.  Migraine currently stable continues to have episode of call episodes of migraine lasting up to 1 day about once a week with localized pressure-like headache with light sensitivity, nausea and noise sensitivity.    Patient Active Problem List   Diagnosis    Severe obesity (HCC)    IIH (idiopathic intracranial hypertension)    Headache    HSV-1 infection    Migraines    Alopecia of scalp      Current Outpatient Medications   Medication Sig Dispense Refill    topiramate (TOPAMAX) 100 MG tablet Take 0.5 tablets by mouth 2 times daily 60 tablet 5    cloNIDine (CATAPRES-TTS-1) 0.1 MG/24HR PTWK Place 1 patch onto the skin every 7 days for hot flashes 12 patch 1    medroxyPROGESTERone (PROVERA) 10 MG tablet Take 1 tablet by mouth daily Take 1 tab daily for 10 days every month. 30 tablet 3    valACYclovir (VALTREX) 500 MG tablet Take 1 tablet by mouth daily 30 tablet 4    ibuprofen (ADVIL;MOTRIN) 800 MG tablet Take 1 tablet by mouth every 6 hours as needed for Pain 90 tablet 1    butalbital-acetaminophen-caffeine (FIORICET, ESGIC) -40 MG per tablet Take 1 tablet by mouth every 4 hours as needed for Headaches 60 tablet 0    Ciclopirox 1 % SHAM       ketoconazole (NIZORAL) 2 % cream       medroxyPROGESTERone (PROVERA) 10 MG tablet Take 1 tablet by mouth daily (Patient not taking: Reported on 7/26/2024) 20 tablet 0    Benzocaine-Menthol (CEPACOL SORE THROAT) 10-2.1 MG LOZG Take 1 each by mouth every 4 hours as needed (sore throat) 18 lozenge 0

## 2024-07-30 VITALS
SYSTOLIC BLOOD PRESSURE: 138 MMHG | WEIGHT: 228 LBS | OXYGEN SATURATION: 100 % | DIASTOLIC BLOOD PRESSURE: 88 MMHG | RESPIRATION RATE: 18 BRPM | BODY MASS INDEX: 36.64 KG/M2 | HEART RATE: 76 BPM | TEMPERATURE: 97.7 F | HEIGHT: 66 IN

## 2024-07-31 ENCOUNTER — APPOINTMENT (OUTPATIENT)
Facility: HOSPITAL | Age: 44
End: 2024-07-31
Payer: COMMERCIAL

## 2024-07-31 ENCOUNTER — HOSPITAL ENCOUNTER (EMERGENCY)
Facility: HOSPITAL | Age: 44
Discharge: HOME | End: 2024-07-31
Attending: EMERGENCY MEDICINE
Payer: COMMERCIAL

## 2024-07-31 VITALS
OXYGEN SATURATION: 98 % | BODY MASS INDEX: 36.64 KG/M2 | RESPIRATION RATE: 18 BRPM | WEIGHT: 228 LBS | SYSTOLIC BLOOD PRESSURE: 133 MMHG | TEMPERATURE: 99.1 F | HEART RATE: 60 BPM | DIASTOLIC BLOOD PRESSURE: 81 MMHG | HEIGHT: 66 IN

## 2024-07-31 DIAGNOSIS — K57.32 DIVERTICULITIS OF COLON: Primary | ICD-10-CM

## 2024-07-31 DIAGNOSIS — K59.00 CONSTIPATION, UNSPECIFIED CONSTIPATION TYPE: ICD-10-CM

## 2024-07-31 LAB
ALBUMIN SERPL-MCNC: 3.5 G/DL (ref 3.5–5)
ALBUMIN/GLOB SERPL: 1 (ref 1.1–2.2)
ALP SERPL-CCNC: 79 U/L (ref 45–117)
ALT SERPL-CCNC: 20 U/L (ref 12–78)
ANION GAP SERPL CALC-SCNC: 11 MMOL/L (ref 5–15)
APPEARANCE UR: CLEAR
AST SERPL W P-5'-P-CCNC: 13 U/L (ref 15–37)
BACTERIA URNS QL MICRO: NEGATIVE /HPF
BASOPHILS # BLD: 0 K/UL (ref 0–0.1)
BASOPHILS NFR BLD: 0 % (ref 0–1)
BILIRUB SERPL-MCNC: 0.2 MG/DL (ref 0.2–1)
BILIRUB UR QL: NEGATIVE
BUN SERPL-MCNC: 12 MG/DL (ref 6–20)
BUN/CREAT SERPL: 13 (ref 12–20)
CA-I BLD-MCNC: 8.8 MG/DL (ref 8.5–10.1)
CHLORIDE SERPL-SCNC: 108 MMOL/L (ref 97–108)
CO2 SERPL-SCNC: 27 MMOL/L (ref 21–32)
COLOR UR: YELLOW
CREAT SERPL-MCNC: 0.9 MG/DL (ref 0.55–1.02)
DIFFERENTIAL METHOD BLD: ABNORMAL
EOSINOPHIL # BLD: 0.2 K/UL (ref 0–0.4)
EOSINOPHIL NFR BLD: 2 % (ref 0–7)
ERYTHROCYTE [DISTWIDTH] IN BLOOD BY AUTOMATED COUNT: 12.9 % (ref 11.5–14.5)
GLOBULIN SER CALC-MCNC: 3.4 G/DL (ref 2–4)
GLUCOSE SERPL-MCNC: 104 MG/DL (ref 65–100)
GLUCOSE UR STRIP.AUTO-MCNC: NEGATIVE MG/DL
HCG UR QL: NEGATIVE
HCT VFR BLD AUTO: 32.9 % (ref 35–47)
HGB BLD-MCNC: 10.9 G/DL (ref 11.5–16)
HGB UR QL STRIP: NEGATIVE
IMM GRANULOCYTES # BLD AUTO: 0 K/UL (ref 0–0.04)
IMM GRANULOCYTES NFR BLD AUTO: 0 % (ref 0–0.5)
KETONES UR QL STRIP.AUTO: NEGATIVE MG/DL
LEUKOCYTE ESTERASE UR QL STRIP.AUTO: NEGATIVE
LYMPHOCYTES # BLD: 3 K/UL (ref 0.8–3.5)
LYMPHOCYTES NFR BLD: 47 % (ref 12–49)
MCH RBC QN AUTO: 32.5 PG (ref 26–34)
MCHC RBC AUTO-ENTMCNC: 33.1 G/DL (ref 30–36.5)
MCV RBC AUTO: 98.2 FL (ref 80–99)
MONOCYTES # BLD: 0.5 K/UL (ref 0–1)
MONOCYTES NFR BLD: 7 % (ref 5–13)
NEUTS SEG # BLD: 2.9 K/UL (ref 1.8–8)
NEUTS SEG NFR BLD: 44 % (ref 32–75)
NITRITE UR QL STRIP.AUTO: NEGATIVE
PH UR STRIP: 7 (ref 5–8)
PLATELET # BLD AUTO: 249 K/UL (ref 150–400)
PMV BLD AUTO: 9.9 FL (ref 8.9–12.9)
POTASSIUM SERPL-SCNC: 3.1 MMOL/L (ref 3.5–5.1)
PROT SERPL-MCNC: 6.9 G/DL (ref 6.4–8.2)
PROT UR STRIP-MCNC: NEGATIVE MG/DL
RBC # BLD AUTO: 3.35 M/UL (ref 3.8–5.2)
RBC #/AREA URNS HPF: ABNORMAL /HPF (ref 0–5)
SODIUM SERPL-SCNC: 146 MMOL/L (ref 136–145)
SP GR UR REFRACTOMETRY: 1.01 (ref 1–1.03)
URINE CULTURE IF INDICATED: ABNORMAL
UROBILINOGEN UR QL STRIP.AUTO: 0.1 EU/DL (ref 0.2–1)
WBC # BLD AUTO: 6.6 K/UL (ref 3.6–11)
WBC URNS QL MICRO: ABNORMAL /HPF (ref 0–4)

## 2024-07-31 PROCEDURE — 99285 EMERGENCY DEPT VISIT HI MDM: CPT

## 2024-07-31 PROCEDURE — 6360000004 HC RX CONTRAST MEDICATION: Performed by: EMERGENCY MEDICINE

## 2024-07-31 PROCEDURE — 81001 URINALYSIS AUTO W/SCOPE: CPT

## 2024-07-31 PROCEDURE — 85025 COMPLETE CBC W/AUTO DIFF WBC: CPT

## 2024-07-31 PROCEDURE — 81025 URINE PREGNANCY TEST: CPT

## 2024-07-31 PROCEDURE — 80053 COMPREHEN METABOLIC PANEL: CPT

## 2024-07-31 PROCEDURE — 6370000000 HC RX 637 (ALT 250 FOR IP): Performed by: EMERGENCY MEDICINE

## 2024-07-31 PROCEDURE — 36415 COLL VENOUS BLD VENIPUNCTURE: CPT

## 2024-07-31 PROCEDURE — 74177 CT ABD & PELVIS W/CONTRAST: CPT

## 2024-07-31 RX ORDER — CIPROFLOXACIN 500 MG/1
500 TABLET, FILM COATED ORAL 2 TIMES DAILY
Qty: 20 TABLET | Refills: 0 | Status: SHIPPED | OUTPATIENT
Start: 2024-07-31 | End: 2024-08-10

## 2024-07-31 RX ORDER — DOCUSATE SODIUM 100 MG/1
100 CAPSULE, LIQUID FILLED ORAL 2 TIMES DAILY
Qty: 28 CAPSULE | Refills: 0 | Status: SHIPPED | OUTPATIENT
Start: 2024-07-31 | End: 2024-08-14

## 2024-07-31 RX ORDER — METRONIDAZOLE 500 MG/1
500 TABLET ORAL 3 TIMES DAILY
Qty: 30 TABLET | Refills: 0 | Status: SHIPPED | OUTPATIENT
Start: 2024-07-31 | End: 2024-08-10

## 2024-07-31 RX ORDER — ACETAMINOPHEN 500 MG
1000 TABLET ORAL
Status: COMPLETED | OUTPATIENT
Start: 2024-07-31 | End: 2024-07-31

## 2024-07-31 RX ORDER — CIPROFLOXACIN 500 MG/1
500 TABLET, FILM COATED ORAL
Status: COMPLETED | OUTPATIENT
Start: 2024-07-31 | End: 2024-07-31

## 2024-07-31 RX ORDER — POLYETHYLENE GLYCOL 3350 17 G/17G
17 POWDER, FOR SOLUTION ORAL DAILY PRN
Qty: 510 G | Refills: 0 | Status: SHIPPED | OUTPATIENT
Start: 2024-07-31 | End: 2024-08-30

## 2024-07-31 RX ORDER — OXYCODONE HYDROCHLORIDE AND ACETAMINOPHEN 5; 325 MG/1; MG/1
1 TABLET ORAL EVERY 6 HOURS PRN
Qty: 12 TABLET | Refills: 0 | Status: SHIPPED | OUTPATIENT
Start: 2024-07-31 | End: 2024-08-03

## 2024-07-31 RX ORDER — IBUPROFEN 600 MG/1
600 TABLET ORAL
Status: COMPLETED | OUTPATIENT
Start: 2024-07-31 | End: 2024-07-31

## 2024-07-31 RX ORDER — METRONIDAZOLE 500 MG/1
500 TABLET ORAL
Status: COMPLETED | OUTPATIENT
Start: 2024-07-31 | End: 2024-07-31

## 2024-07-31 RX ORDER — ACETAMINOPHEN 500 MG
500 TABLET ORAL EVERY 6 HOURS PRN
Qty: 20 TABLET | Refills: 0 | Status: SHIPPED | OUTPATIENT
Start: 2024-07-31 | End: 2024-08-07

## 2024-07-31 RX ORDER — IBUPROFEN 600 MG/1
600 TABLET ORAL EVERY 6 HOURS PRN
Qty: 28 TABLET | Refills: 0 | Status: SHIPPED | OUTPATIENT
Start: 2024-07-31 | End: 2024-08-07

## 2024-07-31 RX ADMIN — IOPAMIDOL 100 ML: 755 INJECTION, SOLUTION INTRAVENOUS at 20:42

## 2024-07-31 RX ADMIN — CIPROFLOXACIN 500 MG: 500 TABLET, FILM COATED ORAL at 22:15

## 2024-07-31 RX ADMIN — IBUPROFEN 600 MG: 600 TABLET, FILM COATED ORAL at 19:16

## 2024-07-31 RX ADMIN — METRONIDAZOLE 500 MG: 500 TABLET ORAL at 22:15

## 2024-07-31 RX ADMIN — ACETAMINOPHEN 1000 MG: 500 TABLET ORAL at 19:16

## 2024-07-31 ASSESSMENT — LIFESTYLE VARIABLES
HOW OFTEN DO YOU HAVE A DRINK CONTAINING ALCOHOL: 2-4 TIMES A MONTH
HOW MANY STANDARD DRINKS CONTAINING ALCOHOL DO YOU HAVE ON A TYPICAL DAY: 1 OR 2

## 2024-07-31 ASSESSMENT — PAIN - FUNCTIONAL ASSESSMENT: PAIN_FUNCTIONAL_ASSESSMENT: 0-10

## 2024-07-31 ASSESSMENT — PAIN DESCRIPTION - LOCATION: LOCATION: ABDOMEN

## 2024-07-31 ASSESSMENT — PAIN SCALES - GENERAL: PAINLEVEL_OUTOF10: 8

## 2024-07-31 ASSESSMENT — PAIN DESCRIPTION - ORIENTATION: ORIENTATION: LEFT;LOWER

## 2024-07-31 ASSESSMENT — PAIN DESCRIPTION - DESCRIPTORS: DESCRIPTORS: ACHING

## 2024-07-31 NOTE — ED PROVIDER NOTES
[facility] EMERGENCY DEPARTMENT  EMERGENCY DEPARTMENT HISTORY AND PHYSICAL EXAM      Date: 2024  Patient Name: Ophelia Au  MRN: [id number]  Birthdate 1980  Date of evaluation: 2024  Provider: Arpan Krishnamurthy DO   Note Started: 7:10 PM EDT 24    HISTORY OF PRESENT ILLNESS     Chief Complaint   Patient presents with    Lower Abdominal Pain     History Provided By: Patient    HPI: Ophelia Au is a 44 y.o. female with past medical history of *** who presents with left pelvic pain worse w/ movement x 1-2 days.     PAST MEDICAL HISTORY   Past Medical History:  Past Medical History:   Diagnosis Date    Headache     HSV-1 infection     Hypertension     Migraines        Past Surgical History:  Past Surgical History:   Procedure Laterality Date    TUBAL LIGATION         Family History:  Family History   Problem Relation Age of Onset    Ovarian Cancer Sister     Ovarian Cancer Mother     Migraines Sister     Migraines Mother        Social History:  Social History     Tobacco Use    Smoking status: Former     Current packs/day: 0.00     Types: Cigarettes     Quit date: 2019     Years since quittin.5    Smokeless tobacco: Never   Vaping Use    Vaping Use: Never used   Substance Use Topics    Alcohol use: Yes    Drug use: Never       Allergies:  No Known Allergies    PCP: Yohana Au APRN - CNP    Current Meds:   Current Facility-Administered Medications   Medication Dose Route Frequency Provider Last Rate Last Admin    ibuprofen (ADVIL;MOTRIN) tablet 600 mg  600 mg Oral NOW Arpan Krishnamurthy DO        acetaminophen (TYLENOL) tablet 1,000 mg  1,000 mg Oral NOW Arpan Krishnamurthy,          Current Outpatient Medications   Medication Sig Dispense Refill    topiramate (TOPAMAX) 100 MG tablet Take 0.5 tablets by mouth 2 times daily 60 tablet 5    cloNIDine (CATAPRES-TTS-1) 0.1 MG/24HR PTWK Place 1 patch onto the skin every 7 days for hot flashes 12 patch 1    medroxyPROGESTERone (PROVERA) 10 MG

## 2024-08-01 NOTE — DISCHARGE INSTRUCTIONS
Thank you for choosing our Emergency Department for your care.  It is our privilege to care for you in your time of need.  In the next several days, you may receive a survey via email or mailed to your home about your experience with our team.  We would greatly appreciate you taking a few minutes to complete the survey, as we use this information to learn what we have done well and what we could be doing better. Thank you for trusting us with your care!    Below you will find a list of your tests from today's visit.   Labs  Recent Results (from the past 12 hour(s))   Urinalysis with Reflex to Culture    Collection Time: 07/31/24  7:15 PM    Specimen: Urine   Result Value Ref Range    Color, UA Yellow      Appearance Clear Clear      Specific Gravity, UA 1.010 1.003 - 1.030      pH, Urine 7.0 5.0 - 8.0      Protein, UA Negative Negative mg/dL    Glucose, Ur Negative Negative mg/dL    Ketones, Urine Negative Negative mg/dL    Bilirubin, Urine Negative Negative      Blood, Urine Negative Negative      Urobilinogen, Urine 0.1 (L) 0.2 - 1.0 EU/dL    Nitrite, Urine Negative Negative      Leukocyte Esterase, Urine Negative Negative      WBC, UA 0-4 0 - 4 /hpf    RBC, UA 0-5 0 - 5 /hpf    BACTERIA, URINE Negative Negative /hpf    Urine Culture if Indicated Culture not indicated by UA result Culture not indicated by UA result     CBC with Auto Differential    Collection Time: 07/31/24  7:17 PM   Result Value Ref Range    WBC 6.6 3.6 - 11.0 K/uL    RBC 3.35 (L) 3.80 - 5.20 M/uL    Hemoglobin 10.9 (L) 11.5 - 16.0 g/dL    Hematocrit 32.9 (L) 35.0 - 47.0 %    MCV 98.2 80.0 - 99.0 FL    MCH 32.5 26.0 - 34.0 PG    MCHC 33.1 30.0 - 36.5 g/dL    RDW 12.9 11.5 - 14.5 %    Platelets 249 150 - 400 K/uL    MPV 9.9 8.9 - 12.9 FL    Neutrophils % 44 32 - 75 %    Lymphocytes % 47 12 - 49 %    Monocytes % 7 5 - 13 %    Eosinophils % 2 0 - 7 %    Basophils % 0 0 - 1 %    Immature Granulocytes % 0 0.0 - 0.5 %    Neutrophils Absolute 2.9

## 2024-08-16 ENCOUNTER — OFFICE VISIT (OUTPATIENT)
Age: 44
End: 2024-08-16
Payer: COMMERCIAL

## 2024-08-16 VITALS
RESPIRATION RATE: 16 BRPM | DIASTOLIC BLOOD PRESSURE: 98 MMHG | OXYGEN SATURATION: 99 % | HEIGHT: 66 IN | HEART RATE: 90 BPM | TEMPERATURE: 96.9 F | BODY MASS INDEX: 35.57 KG/M2 | WEIGHT: 221.31 LBS | SYSTOLIC BLOOD PRESSURE: 140 MMHG

## 2024-08-16 DIAGNOSIS — R10.2 PELVIC PAIN: Primary | ICD-10-CM

## 2024-08-16 DIAGNOSIS — Z87.19 HISTORY OF DIVERTICULOSIS: ICD-10-CM

## 2024-08-16 PROCEDURE — 99213 OFFICE O/P EST LOW 20 MIN: CPT | Performed by: OBSTETRICS & GYNECOLOGY

## 2024-08-16 NOTE — PROGRESS NOTES
Ophelia is a 44 y.o. female who presents today for the following:    Chief Complaint   Patient presents with    Other     C/O pelvic pain; missed cycle        No Known Allergies       Current Outpatient Medications:     valACYclovir (VALTREX) 500 MG tablet, Take 1 tablet by mouth daily, Disp: 30 tablet, Rfl: 4    acetaminophen (TYLENOL) 500 MG tablet, Take 1 tablet by mouth every 6 hours as needed for Pain, Disp: 20 tablet, Rfl: 0    ibuprofen (IBU) 600 MG tablet, Take 1 tablet by mouth every 6 hours as needed for Pain, Disp: 28 tablet, Rfl: 0    polyethylene glycol (GLYCOLAX) 17 GM/SCOOP powder, Take 17 g by mouth daily as needed (constipation) (Patient not taking: Reported on 8/16/2024), Disp: 510 g, Rfl: 0    topiramate (TOPAMAX) 100 MG tablet, Take 0.5 tablets by mouth 2 times daily (Patient not taking: Reported on 8/16/2024), Disp: 60 tablet, Rfl: 5    cloNIDine (CATAPRES-TTS-1) 0.1 MG/24HR PTWK, Place 1 patch onto the skin every 7 days for hot flashes (Patient not taking: Reported on 8/16/2024), Disp: 12 patch, Rfl: 1    medroxyPROGESTERone (PROVERA) 10 MG tablet, Take 1 tablet by mouth daily Take 1 tab daily for 10 days every month. (Patient not taking: Reported on 8/16/2024), Disp: 30 tablet, Rfl: 3    ibuprofen (ADVIL;MOTRIN) 800 MG tablet, Take 1 tablet by mouth every 6 hours as needed for Pain (Patient not taking: Reported on 8/16/2024), Disp: 90 tablet, Rfl: 1    butalbital-acetaminophen-caffeine (FIORICET, ESGIC) -40 MG per tablet, Take 1 tablet by mouth every 4 hours as needed for Headaches (Patient not taking: Reported on 8/16/2024), Disp: 60 tablet, Rfl: 0    Ciclopirox 1 % SHAM, , Disp: , Rfl:     ketoconazole (NIZORAL) 2 % cream, , Disp: , Rfl:      Past Medical History:   Diagnosis Date    Headache     HSV-1 infection     Hypertension     Migraines         Past Surgical History:   Procedure Laterality Date    TUBAL LIGATION          Family History   Problem Relation Age of Onset    Ovarian

## 2024-09-11 ENCOUNTER — HOSPITAL ENCOUNTER (EMERGENCY)
Facility: HOSPITAL | Age: 44
Discharge: HOME | End: 2024-09-11
Attending: FAMILY MEDICINE
Payer: COMMERCIAL

## 2024-09-11 VITALS
TEMPERATURE: 98.3 F | RESPIRATION RATE: 16 BRPM | DIASTOLIC BLOOD PRESSURE: 89 MMHG | HEIGHT: 66 IN | BODY MASS INDEX: 35.68 KG/M2 | WEIGHT: 222 LBS | SYSTOLIC BLOOD PRESSURE: 150 MMHG | OXYGEN SATURATION: 100 % | HEART RATE: 82 BPM

## 2024-09-11 DIAGNOSIS — G89.29 CHRONIC NONINTRACTABLE HEADACHE, UNSPECIFIED HEADACHE TYPE: ICD-10-CM

## 2024-09-11 DIAGNOSIS — R03.0 ELEVATED BLOOD PRESSURE READING: Primary | ICD-10-CM

## 2024-09-11 DIAGNOSIS — R51.9 CHRONIC NONINTRACTABLE HEADACHE, UNSPECIFIED HEADACHE TYPE: ICD-10-CM

## 2024-09-11 PROCEDURE — 99283 EMERGENCY DEPT VISIT LOW MDM: CPT

## 2024-09-11 PROCEDURE — 93005 ELECTROCARDIOGRAM TRACING: CPT | Performed by: FAMILY MEDICINE

## 2024-09-11 ASSESSMENT — PAIN - FUNCTIONAL ASSESSMENT: PAIN_FUNCTIONAL_ASSESSMENT: NONE - DENIES PAIN

## 2024-09-12 LAB
EKG ATRIAL RATE: 63 BPM
EKG DIAGNOSIS: NORMAL
EKG P AXIS: 40 DEGREES
EKG P-R INTERVAL: 180 MS
EKG Q-T INTERVAL: 404 MS
EKG QRS DURATION: 88 MS
EKG QTC CALCULATION (BAZETT): 413 MS
EKG R AXIS: 2 DEGREES
EKG T AXIS: 24 DEGREES
EKG VENTRICULAR RATE: 63 BPM

## 2024-09-16 ENCOUNTER — OFFICE VISIT (OUTPATIENT)
Facility: CLINIC | Age: 44
End: 2024-09-16
Payer: COMMERCIAL

## 2024-09-16 VITALS
TEMPERATURE: 98.1 F | WEIGHT: 226 LBS | BODY MASS INDEX: 36.32 KG/M2 | HEART RATE: 80 BPM | RESPIRATION RATE: 18 BRPM | SYSTOLIC BLOOD PRESSURE: 170 MMHG | DIASTOLIC BLOOD PRESSURE: 97 MMHG | HEIGHT: 66 IN | OXYGEN SATURATION: 98 %

## 2024-09-16 DIAGNOSIS — E87.6 HYPOKALEMIA: ICD-10-CM

## 2024-09-16 DIAGNOSIS — I10 PRIMARY HYPERTENSION: Primary | ICD-10-CM

## 2024-09-16 PROBLEM — G43.019 REFRACTORY MIGRAINE WITHOUT AURA: Status: ACTIVE | Noted: 2024-09-16

## 2024-09-16 PROCEDURE — 3077F SYST BP >= 140 MM HG: CPT

## 2024-09-16 PROCEDURE — 3080F DIAST BP >= 90 MM HG: CPT

## 2024-09-16 PROCEDURE — 99214 OFFICE O/P EST MOD 30 MIN: CPT

## 2024-09-16 RX ORDER — AMLODIPINE BESYLATE 5 MG/1
5 TABLET ORAL DAILY
Qty: 30 TABLET | Refills: 0 | Status: SHIPPED | OUTPATIENT
Start: 2024-09-16

## 2024-09-16 ASSESSMENT — PATIENT HEALTH QUESTIONNAIRE - PHQ9
3. TROUBLE FALLING OR STAYING ASLEEP: NOT AT ALL
SUM OF ALL RESPONSES TO PHQ QUESTIONS 1-9: 0
10. IF YOU CHECKED OFF ANY PROBLEMS, HOW DIFFICULT HAVE THESE PROBLEMS MADE IT FOR YOU TO DO YOUR WORK, TAKE CARE OF THINGS AT HOME, OR GET ALONG WITH OTHER PEOPLE: NOT DIFFICULT AT ALL
SUM OF ALL RESPONSES TO PHQ9 QUESTIONS 1 & 2: 0
7. TROUBLE CONCENTRATING ON THINGS, SUCH AS READING THE NEWSPAPER OR WATCHING TELEVISION: NOT AT ALL
5. POOR APPETITE OR OVEREATING: NOT AT ALL
2. FEELING DOWN, DEPRESSED OR HOPELESS: NOT AT ALL
SUM OF ALL RESPONSES TO PHQ QUESTIONS 1-9: 0
6. FEELING BAD ABOUT YOURSELF - OR THAT YOU ARE A FAILURE OR HAVE LET YOURSELF OR YOUR FAMILY DOWN: NOT AT ALL
SUM OF ALL RESPONSES TO PHQ QUESTIONS 1-9: 0
1. LITTLE INTEREST OR PLEASURE IN DOING THINGS: NOT AT ALL
8. MOVING OR SPEAKING SO SLOWLY THAT OTHER PEOPLE COULD HAVE NOTICED. OR THE OPPOSITE, BEING SO FIGETY OR RESTLESS THAT YOU HAVE BEEN MOVING AROUND A LOT MORE THAN USUAL: NOT AT ALL
SUM OF ALL RESPONSES TO PHQ QUESTIONS 1-9: 0
4. FEELING TIRED OR HAVING LITTLE ENERGY: NOT AT ALL
9. THOUGHTS THAT YOU WOULD BE BETTER OFF DEAD, OR OF HURTING YOURSELF: NOT AT ALL

## 2024-09-16 ASSESSMENT — ANXIETY QUESTIONNAIRES
IF YOU CHECKED OFF ANY PROBLEMS ON THIS QUESTIONNAIRE, HOW DIFFICULT HAVE THESE PROBLEMS MADE IT FOR YOU TO DO YOUR WORK, TAKE CARE OF THINGS AT HOME, OR GET ALONG WITH OTHER PEOPLE: NOT DIFFICULT AT ALL
3. WORRYING TOO MUCH ABOUT DIFFERENT THINGS: NOT AT ALL
1. FEELING NERVOUS, ANXIOUS, OR ON EDGE: NOT AT ALL
2. NOT BEING ABLE TO STOP OR CONTROL WORRYING: NOT AT ALL
4. TROUBLE RELAXING: NOT AT ALL
7. FEELING AFRAID AS IF SOMETHING AWFUL MIGHT HAPPEN: NOT AT ALL
GAD7 TOTAL SCORE: 0
5. BEING SO RESTLESS THAT IT IS HARD TO SIT STILL: NOT AT ALL
6. BECOMING EASILY ANNOYED OR IRRITABLE: NOT AT ALL

## 2024-09-18 ENCOUNTER — PATIENT MESSAGE (OUTPATIENT)
Facility: CLINIC | Age: 44
End: 2024-09-18

## 2024-09-18 LAB
BUN SERPL-MCNC: 9 MG/DL (ref 6–24)
BUN/CREAT SERPL: 13 (ref 9–23)
CALCIUM SERPL-MCNC: 9.6 MG/DL (ref 8.7–10.2)
CHLORIDE SERPL-SCNC: 106 MMOL/L (ref 96–106)
CO2 SERPL-SCNC: 23 MMOL/L (ref 20–29)
CREAT SERPL-MCNC: 0.71 MG/DL (ref 0.57–1)
EGFRCR SERPLBLD CKD-EPI 2021: 109 ML/MIN/1.73
GLUCOSE SERPL-MCNC: 92 MG/DL (ref 70–99)
POTASSIUM SERPL-SCNC: 3.6 MMOL/L (ref 3.5–5.2)
SODIUM SERPL-SCNC: 143 MMOL/L (ref 134–144)

## 2024-10-03 RX ORDER — POTASSIUM CHLORIDE 750 MG/1
10 TABLET, EXTENDED RELEASE ORAL DAILY
Qty: 90 TABLET | Refills: 1 | Status: SHIPPED | OUTPATIENT
Start: 2024-10-03

## 2024-10-03 NOTE — TELEPHONE ENCOUNTER
I went ahead and send the potassium supplement for you to be taken daily.  Yohana Au, APRN - CNP

## 2024-10-04 ENCOUNTER — OFFICE VISIT (OUTPATIENT)
Facility: CLINIC | Age: 44
End: 2024-10-04
Payer: COMMERCIAL

## 2024-10-04 VITALS
OXYGEN SATURATION: 98 % | HEIGHT: 66 IN | DIASTOLIC BLOOD PRESSURE: 86 MMHG | RESPIRATION RATE: 18 BRPM | SYSTOLIC BLOOD PRESSURE: 136 MMHG | HEART RATE: 70 BPM | WEIGHT: 224 LBS | BODY MASS INDEX: 36 KG/M2 | TEMPERATURE: 98 F

## 2024-10-04 DIAGNOSIS — M54.9 UPPER BACK PAIN ON RIGHT SIDE: ICD-10-CM

## 2024-10-04 DIAGNOSIS — G43.101 MIGRAINE WITH AURA AND WITH STATUS MIGRAINOSUS, NOT INTRACTABLE: ICD-10-CM

## 2024-10-04 DIAGNOSIS — I10 ESSENTIAL HYPERTENSION: Primary | ICD-10-CM

## 2024-10-04 DIAGNOSIS — E87.6 HYPOKALEMIA: ICD-10-CM

## 2024-10-04 PROBLEM — G43.109 MIGRAINE WITH AURA: Status: ACTIVE | Noted: 2024-09-16

## 2024-10-04 PROBLEM — G43.909 MIGRAINES: Status: RESOLVED | Noted: 2023-04-12 | Resolved: 2024-10-04

## 2024-10-04 PROCEDURE — 99214 OFFICE O/P EST MOD 30 MIN: CPT | Performed by: STUDENT IN AN ORGANIZED HEALTH CARE EDUCATION/TRAINING PROGRAM

## 2024-10-04 PROCEDURE — 3075F SYST BP GE 130 - 139MM HG: CPT | Performed by: STUDENT IN AN ORGANIZED HEALTH CARE EDUCATION/TRAINING PROGRAM

## 2024-10-04 PROCEDURE — 3079F DIAST BP 80-89 MM HG: CPT | Performed by: STUDENT IN AN ORGANIZED HEALTH CARE EDUCATION/TRAINING PROGRAM

## 2024-10-04 RX ORDER — AMLODIPINE BESYLATE 5 MG/1
5 TABLET ORAL DAILY
Qty: 90 TABLET | Refills: 1 | Status: SHIPPED | OUTPATIENT
Start: 2024-10-04

## 2024-10-04 RX ORDER — TOPIRAMATE 100 MG/1
50 TABLET, FILM COATED ORAL 2 TIMES DAILY
COMMUNITY

## 2024-10-04 RX ORDER — RIZATRIPTAN BENZOATE 10 MG/1
10 TABLET ORAL
Qty: 30 TABLET | Refills: 0 | Status: SHIPPED | OUTPATIENT
Start: 2024-10-04 | End: 2024-10-04

## 2024-10-04 RX ORDER — CYCLOBENZAPRINE HCL 5 MG
5 TABLET ORAL 3 TIMES DAILY PRN
Qty: 30 TABLET | Refills: 0 | Status: SHIPPED | OUTPATIENT
Start: 2024-10-04 | End: 2024-10-14

## 2024-10-04 NOTE — PROGRESS NOTES
\"Have you been to the ER, urgent care clinic since your last visit?  Hospitalized since your last visit?\"    NO    “Have you seen or consulted any other health care providers outside our system since your last visit?”    NO    Chief Complaint   Patient presents with    Follow-up    Hypertension         /86 (Site: Right Upper Arm, Position: Sitting, Cuff Size: Large Adult)   Pulse 70   Temp 98 °F (36.7 °C) (Temporal)   Resp 18   Ht 1.676 m (5' 6\")   Wt 101.6 kg (224 lb)   SpO2 98%   BMI 36.15 kg/m²             Referring Physician (Optional): Graham Marroquin MD

## 2024-10-04 NOTE — PROGRESS NOTES
[facility]  Subjective       Chief Complaint   Patient presents with    Follow-up    Hypertension     HPI:  Ophelia Au is a 44 y.o. female.    Pcp: Yohana Au, LESA - CNP  Last visit was with Thuy Hou for an ED follow up for asymptomatic HTN    HYPERTENSION, Essential  + hypokalemia   Diagnosed: 2024  Reports Compliance with meds: YES  Amlodipine 5 mg daily  Taking Potassium Chloride 10 mEQ daily  (Was on clonidine for hot flashes but stopped and pressures increased)  Checking BP at Home: checked one time, has a BP cuff  Says she is no longer waking up with headaches in the morning   Has had some swelling  Denies Chest pain, shortness of breath, vision changes, change in urination.    Lab Results   Component Value Date/Time     09/17/2024 03:10 PM    K 3.6 09/17/2024 03:10 PM     09/17/2024 03:10 PM    CO2 23 09/17/2024 03:10 PM    BUN 9 09/17/2024 03:10 PM    CREATININE 0.71 09/17/2024 03:10 PM    GLUCOSE 92 09/17/2024 03:10 PM    CALCIUM 9.6 09/17/2024 03:10 PM    LABGLOM 109 09/17/2024 03:10 PM    LABGLOM 86 06/21/2023 03:08 PM      UPPER BACK PAIN  Says muscle on right side feels bigger than left  Aching during day but preventing sleeping at night for a few days  Says was working more with that arm lately  Tylenol has helped    MIGRAINES with AURA  In one month span says she gets 10 migraines  She describes as being on one side of her head, starts in back and works to front  \"Pulling on her eyes\"  Says she sees black specks falling   Sensitive to light/noise  Gets nauseous/vomiting  She does see a neurologist- [facility] Dr Fang  Says she tried a lot of different migraine medications- was taking Erenumab but then insurance stopped covering    Past Medical History:   Diagnosis Date    Headache     HSV-1 infection     Hypertension     Migraines      Current Outpatient Medications   Medication Sig Dispense Refill    topiramate (TOPAMAX) 100 MG tablet Take

## 2024-10-06 ENCOUNTER — PATIENT MESSAGE (OUTPATIENT)
Facility: CLINIC | Age: 44
End: 2024-10-06

## 2024-10-07 NOTE — TELEPHONE ENCOUNTER
Is this requesting for an order of some sort?  []Yes    [x]No  If Yes, what is being requested?      Is the patient calling about a new issue (illness, pain, etc)?  []Yes    [x]No  If yes, when did this specific issue start?      ENC Note:Patient called and states her feet are swollen more than when she was here on Friday.  Patient states she put in message and has not heard back.  Can clinical please call patient at [phone] and advise what she needs to do?      Best Contact number: [phone]

## 2024-10-07 NOTE — TELEPHONE ENCOUNTER
Is this requesting for an order of some sort?  []Yes    [x]No  If Yes, what is being requested?        Is the patient calling about a new issue (illness, pain, etc)?  []Yes    [x]No  If yes, when did this specific issue start?        ENC Note:Patient called and states her feet are swollen more than when she was here on Friday.  Patient states she put in message and has not heard back.  Can clinical please call patient at [phone] and advise what she needs to do? 2nd time patient has called concerning leg swelling.        Best Contact number: [phone]

## 2024-10-07 NOTE — TELEPHONE ENCOUNTER
Is this requesting for an order of some sort?  []Yes    [x]No  If Yes, what is being requested?      Is the patient calling about a new issue (illness, pain, etc)?  []Yes    [x]No  If yes, when did this specific issue start?      ENC Note:Patient called again and asking about the below messages.  Explained message was forwarded to the provider and waiting on provider to review and advise.      Best Contact number:

## 2024-10-24 DIAGNOSIS — B00.9 HSV INFECTION: Primary | ICD-10-CM

## 2024-10-24 RX ORDER — VALACYCLOVIR HYDROCHLORIDE 500 MG/1
500 TABLET, FILM COATED ORAL DAILY
Qty: 30 TABLET | Refills: 3 | Status: SHIPPED | OUTPATIENT
Start: 2024-10-24

## 2024-11-04 ENCOUNTER — TELEPHONE (OUTPATIENT)
Age: 44
End: 2024-11-04

## 2024-11-04 DIAGNOSIS — N92.0 MENORRHAGIA WITH REGULAR CYCLE: Primary | ICD-10-CM

## 2024-11-04 RX ORDER — MEDROXYPROGESTERONE ACETATE 10 MG
10 TABLET ORAL DAILY
Qty: 90 TABLET | Refills: 3 | Status: SHIPPED | OUTPATIENT
Start: 2024-11-04

## 2024-11-04 NOTE — TELEPHONE ENCOUNTER
Patient contacted the office left a voicemail that she needs a refill on her provera.  She reports she thought she had refills left but the pharmacy states inactive so she is not sure if it was discontinued.

## 2024-11-04 NOTE — TELEPHONE ENCOUNTER
Spoke with patient advised that new prescription has been sent to the pharmacy for her to complete.

## 2024-11-30 ENCOUNTER — HOSPITAL ENCOUNTER (EMERGENCY)
Facility: HOSPITAL | Age: 44
Discharge: HOME | End: 2024-11-30
Attending: EMERGENCY MEDICINE
Payer: COMMERCIAL

## 2024-11-30 VITALS
WEIGHT: 233 LBS | TEMPERATURE: 98.7 F | DIASTOLIC BLOOD PRESSURE: 99 MMHG | OXYGEN SATURATION: 99 % | SYSTOLIC BLOOD PRESSURE: 166 MMHG | HEART RATE: 78 BPM | RESPIRATION RATE: 17 BRPM | BODY MASS INDEX: 37.61 KG/M2

## 2024-11-30 DIAGNOSIS — J02.9 ACUTE PHARYNGITIS, UNSPECIFIED ETIOLOGY: Primary | ICD-10-CM

## 2024-11-30 LAB — S PYO DNA THROAT QL NAA+PROBE: NOT DETECTED

## 2024-11-30 PROCEDURE — 6360000002 HC RX W HCPCS: Performed by: EMERGENCY MEDICINE

## 2024-11-30 PROCEDURE — 87651 STREP A DNA AMP PROBE: CPT

## 2024-11-30 PROCEDURE — 99283 EMERGENCY DEPT VISIT LOW MDM: CPT

## 2024-11-30 RX ORDER — DEXAMETHASONE SODIUM PHOSPHATE 10 MG/ML
10 INJECTION, SOLUTION INTRAMUSCULAR; INTRAVENOUS ONCE
Status: COMPLETED | OUTPATIENT
Start: 2024-11-30 | End: 2024-11-30

## 2024-11-30 RX ADMIN — DEXAMETHASONE SODIUM PHOSPHATE 10 MG: 10 INJECTION INTRAMUSCULAR; INTRAVENOUS at 15:41

## 2024-11-30 ASSESSMENT — PAIN DESCRIPTION - PAIN TYPE: TYPE: ACUTE PAIN

## 2024-11-30 ASSESSMENT — PAIN DESCRIPTION - DESCRIPTORS: DESCRIPTORS: BURNING

## 2024-11-30 ASSESSMENT — PAIN DESCRIPTION - LOCATION: LOCATION: THROAT

## 2024-11-30 ASSESSMENT — PAIN - FUNCTIONAL ASSESSMENT: PAIN_FUNCTIONAL_ASSESSMENT: ACTIVITIES ARE NOT PREVENTED

## 2024-11-30 ASSESSMENT — PAIN SCALES - GENERAL: PAINLEVEL_OUTOF10: 3

## 2024-11-30 NOTE — ED TRIAGE NOTES
Patient ambulatory into ED c/o sore throat since Thursday. Took one of her son's amoxicillin yesterday.

## 2024-11-30 NOTE — ED PROVIDER NOTES
are visualized and preliminarily interpreted by the ED Provider with the following findings: Not Applicable.    Interpretation per the Radiologist below, if available at the time of this note:  No orders to display        ED COURSE and DIFFERENTIAL DIAGNOSIS/MDM   3:43 PM Differential and Considerations: Afebrile and nontoxic in no acute distress.  Will check for strep pharyngitis versus viral etiology.    Records Reviewed (source and summary of external notes): Prior medical records and Nursing notes.    Vitals:    Vitals:    11/30/24 1445   BP: (!) 166/99   Pulse: 78   Resp: 17   Temp: 98.7 °F (37.1 °C)   TempSrc: Oral   SpO2: 99%   Weight: 105.7 kg (233 lb)        ED COURSE       SEPSIS Reassessment: Sepsis reassessment not applicable    Clinical Management Tools:  Not Applicable    Patient was given the following medications:  Medications   dexAMETHasone (DECADRON) Oral 10 mg (10 mg Oral Given 11/30/24 1541)       CONSULTS: See ED Course/MDM for further details.  None     Social Determinants affecting Diagnosis/Treatment: None    Smoking Cessation: Not Applicable    PROCEDURES   Unless otherwise noted above, none  Procedures      CRITICAL CARE TIME   Patient does not meet Critical Care Time, 0 minutes    ED IMPRESSION     1. Acute pharyngitis, unspecified etiology          DISPOSITION/PLAN   DISPOSITION Decision To Discharge 11/30/2024 03:41:56 PM         Discharge Note: The patient is stable for discharge home. The signs, symptoms, diagnosis, and discharge instructions have been discussed, understanding conveyed, and agreed upon. The patient is to follow up as recommended or return to ER should their symptoms worsen.      PATIENT REFERRED TO:  Yolanda Ludwig MD  [address] [place] [address]  [phone]      As needed        DISCHARGE MEDICATIONS:     Medication List        ASK your doctor about these medications      acetaminophen 500 MG tablet  Commonly known as:  TYLENOL  Take 1 tablet by mouth every 6 hours as needed for Pain     Ciclopirox 1 % Sham     hydroCHLOROthiazide 12.5 MG capsule  Take 1 capsule by mouth every morning     ketoconazole 2 % cream  Commonly known as: NIZORAL     medroxyPROGESTERone 10 MG tablet  Commonly known as: Provera  Take 1 tablet by mouth daily     potassium chloride 10 MEQ extended release tablet  Commonly known as: KLOR-CON M  Take 1 tablet by mouth daily     rizatriptan 10 MG tablet  Commonly known as: Maxalt  Take 1 tablet by mouth once as needed for Migraine May repeat in 2 hours if needed. Do not take more than 4 tablets per week     topiramate 100 MG tablet  Commonly known as: TOPAMAX     valACYclovir 500 MG tablet  Commonly known as: Valtrex  Take 1 tablet by mouth daily                DISCONTINUED MEDICATIONS:  Current Discharge Medication List          I am the Primary Clinician of Record. Artie Andrews MD (electronically signed)    (Please note that parts of this dictation were completed with voice recognition software. Quite often unanticipated grammatical, syntax, homophones, and other interpretive errors are inadvertently transcribed by the computer software. Please disregards these errors. Please excuse any errors that have escaped final proofreading.)     Artie Andrews MD  11/30/24 7796

## 2024-11-30 NOTE — DISCHARGE INSTRUCTIONS
Thank you for choosing our Emergency Department for your care.  It is our privilege to care for you in your time of need.  In the next several days, you may receive a survey via email or mailed to your home about your experience with our team.  We would greatly appreciate you taking a few minutes to complete the survey, as we use this information to learn what we have done well and what we could be doing better. Thank you for trusting us with your care!    Below you will find a list of your tests from today's visit.   Labs  Recent Results (from the past 12 hour(s))   Group A Strep by PCR    Collection Time: 11/30/24  3:14 PM    Specimen: Swab; Throat   Result Value Ref Range    Strep Grp A PCR Not Detected Not Detected         Radiologic Studies  No orders to display     ------------------------------------------------------------------------------------------------------------  The evaluation and treatment you received in the Emergency Department were for an urgent problem. It is important that you follow-up with a doctor, nurse practitioner, or physician assistant to:  (1) confirm your diagnosis,  (2) re-evaluation of changes in your illness and treatment, and (3) for ongoing care. Please take your discharge instructions with you when you go to your follow-up appointment.     If you have any problem arranging a follow-up appointment, contact us!  If your symptoms become worse or you do not improve as expected, please return to us. We are available 24 hours a day.     If a prescription has been provided, please fill it as soon as possible to prevent a delay in treatment. If you have any questions or reservations about taking the medication due to side effects or interactions with other medications, please call your primary care provider or contact us directly.  Again, THANK YOU for choosing us to care for YOU!

## 2025-01-07 ENCOUNTER — TELEMEDICINE (OUTPATIENT)
Facility: CLINIC | Age: 45
End: 2025-01-07
Payer: COMMERCIAL

## 2025-01-07 DIAGNOSIS — R60.0 EDEMA, LOWER EXTREMITY: ICD-10-CM

## 2025-01-07 DIAGNOSIS — E04.1 THYROID NODULE: ICD-10-CM

## 2025-01-07 DIAGNOSIS — D64.9 ANEMIA, UNSPECIFIED TYPE: ICD-10-CM

## 2025-01-07 DIAGNOSIS — G43.101 MIGRAINE WITH AURA AND WITH STATUS MIGRAINOSUS, NOT INTRACTABLE: ICD-10-CM

## 2025-01-07 DIAGNOSIS — I10 ESSENTIAL HYPERTENSION: Primary | ICD-10-CM

## 2025-01-07 DIAGNOSIS — R35.0 URINE FREQUENCY: ICD-10-CM

## 2025-01-07 DIAGNOSIS — E55.9 VITAMIN D DEFICIENCY: ICD-10-CM

## 2025-01-07 DIAGNOSIS — L65.9 ALOPECIA OF SCALP: Chronic | ICD-10-CM

## 2025-01-07 PROCEDURE — 99214 OFFICE O/P EST MOD 30 MIN: CPT | Performed by: NURSE PRACTITIONER

## 2025-01-07 RX ORDER — FUROSEMIDE 20 MG/1
20 TABLET ORAL 2 TIMES DAILY
Qty: 180 TABLET | Refills: 1 | Status: SHIPPED | OUTPATIENT
Start: 2025-01-07

## 2025-01-07 RX ORDER — AMLODIPINE BESYLATE 5 MG/1
5 TABLET ORAL DAILY
COMMUNITY

## 2025-01-07 NOTE — PROGRESS NOTES
\"Have you been to the ER, urgent care clinic since your last visit?  Hospitalized since your last visit?\"    NO    “Have you seen or consulted any other health care providers outside our system since your last visit?”    NO    Chief Complaint   Patient presents with    FMLA Form Completion     Hypertension    Leg Swelling    Urinary Frequency at Night           Alopecia     Would like a prescription for prenatal vitamins    neck nodule         There were no vitals taken for this visit.

## 2025-01-07 NOTE — PROGRESS NOTES
[facility]  [address] [place] [address] [address]   [place] [address]  (P) [phone] (F) [phone]    Ophelia Au who was evaluated through a synchronous (real-time) audio-video encounter, and/or the patient's healthcare decision maker, is aware that it is a billable service, which includes applicable co-pays, with coverage as determined by the patient's insurance carrier. Ophelia uA provided verbal consent to proceed and patient identification was verified. This visit was conducted pursuant to the emergency declaration under the Davidson Act and the National Emergencies Act, 1135 waiver authority and the Coronavirus Preparedness and Response Supplemental Appropriations Act. A caregiver was present when appropriate. Ability to conduct physical exam was limited. The patient was located at home in a state where the provider was licensed to provide care.     Ophelia Au (: 1980) is a 44 y.o. female, established patient, here for evaluation of the following chief complaint(s):   FMLA Form Completion , Hypertension, Leg Swelling, Urinary Frequency at Night (/), Alopecia (Would like a prescription for prenatal vitamins), and neck nodule         SUBJECTIVE/OBJECTIVE:  HPI    Patient is a 44 y.o. female here today with complaints of FMLA Form Completion , Hypertension, Leg Swelling, Urinary Frequency at Night (/), Alopecia (Would like a prescription for prenatal vitamins), and neck nodule  Leg swelling since amlodipine, does report blood pressure well-controlled 130/80 most of the time.  Frequent urination x 1 month, denies any burning with urination or any discharge or foul odor.  Does report nocturia and increased thirst.  Hair thinning out with tingling sensation, hx of alopecia, once taking prenatal vitamin hair started to grow again, once stopped thinning again.  This has been going on for over 2 years.  Currently not taking any supplementation.       Review of

## 2025-01-08 ENCOUNTER — TELEPHONE (OUTPATIENT)
Facility: CLINIC | Age: 45
End: 2025-01-08

## 2025-01-08 LAB
25(OH)D3+25(OH)D2 SERPL-MCNC: 26.3 NG/ML (ref 30–100)
APPEARANCE UR: CLEAR
BACTERIA #/AREA URNS HPF: ABNORMAL /[HPF]
BILIRUB UR QL STRIP: NEGATIVE
CASTS URNS QL MICRO: ABNORMAL /LPF
COLOR UR: YELLOW
CRYSTALS URNS MICRO: ABNORMAL
EPI CELLS #/AREA URNS HPF: ABNORMAL /HPF (ref 0–10)
FOLATE SERPL-MCNC: 10 NG/ML
GLUCOSE UR QL STRIP: NEGATIVE
HGB UR QL STRIP: NEGATIVE
KETONES UR QL STRIP: NEGATIVE
LEUKOCYTE ESTERASE UR QL STRIP: NEGATIVE
MICRO URNS: NORMAL
MICRO URNS: NORMAL
NITRITE UR QL STRIP: NEGATIVE
PH UR STRIP: 6.5 [PH] (ref 5–7.5)
PROT UR QL STRIP: NORMAL
RBC #/AREA URNS HPF: ABNORMAL /HPF (ref 0–2)
SP GR UR STRIP: 1.02 (ref 1–1.03)
THYROPEROXIDASE AB SERPL-ACNC: 14 IU/ML (ref 0–34)
TSH SERPL DL<=0.005 MIU/L-ACNC: 0.8 UIU/ML (ref 0.45–4.5)
UNIDENT CRYS URNS QL MICRO: PRESENT
URINALYSIS REFLEX: NORMAL
UROBILINOGEN UR STRIP-MCNC: 0.2 MG/DL (ref 0.2–1)
VIT B12 SERPL-MCNC: 329 PG/ML (ref 232–1245)
WBC #/AREA URNS HPF: ABNORMAL /HPF (ref 0–5)

## 2025-01-08 NOTE — TELEPHONE ENCOUNTER
Called patient and advised her la paperwork has been faxed to [employer].  Patient to come in and  original copy of paperwork.

## 2025-01-09 ENCOUNTER — TELEPHONE (OUTPATIENT)
Facility: CLINIC | Age: 45
End: 2025-01-09

## 2025-01-09 NOTE — TELEPHONE ENCOUNTER
Is this requesting for an order of some sort?  []Yes    [x]No  If Yes, what is being requested?      Is the patient calling about a new issue (illness, pain, etc)?  []Yes    [x]No  If yes, when did this specific issue start?      ENC Note: Patient would like a call back on her lab results.  Patient wants to know if there are any concerns or anything she needs to be aware of.      Best Contact number: [phone]

## 2025-02-06 NOTE — TELEPHONE ENCOUNTER
Method of communication:  []Fax [x]Phone call []In person   []Other:    Who is making request:Patient   What medication/s (include strength and dosing):  Ciclopirox 1% Shampoo    This is for a:   [x]Refill    []New medication request  []Follow up on prior request    Pharmacy:[facility] [address]  Best contact for patient:[phone]    Additional notes:

## 2025-02-07 RX ORDER — CICLOPIROX 1 G/100ML
1 SHAMPOO TOPICAL
Qty: 480 ML | Refills: 1 | Status: SHIPPED | OUTPATIENT
Start: 2025-02-07

## 2025-02-16 DIAGNOSIS — B00.9 HSV INFECTION: ICD-10-CM

## 2025-02-17 RX ORDER — VALACYCLOVIR HYDROCHLORIDE 500 MG/1
500 TABLET, FILM COATED ORAL DAILY
Qty: 30 TABLET | Refills: 3 | Status: SHIPPED | OUTPATIENT
Start: 2025-02-17

## 2025-02-18 ENCOUNTER — TELEPHONE (OUTPATIENT)
Age: 45
End: 2025-02-18

## 2025-02-18 NOTE — TELEPHONE ENCOUNTER
Patient contacted the office to reschedule appt.  She is requesting to come on Friday because she is off.  Please advise if okay for her to come.  She reports had a waxing done and keeps getting bumps that won't go away wants to see what is causing it. If okay please advise of what time she may come.

## 2025-02-19 NOTE — TELEPHONE ENCOUNTER
Attempted to contact patient to see if she can come on Monday.  [facility] back [facility] phone [facility]

## 2025-05-16 ENCOUNTER — HOSPITAL ENCOUNTER (EMERGENCY)
Facility: HOSPITAL | Age: 45
Discharge: HOME | End: 2025-05-16
Attending: FAMILY MEDICINE
Payer: COMMERCIAL

## 2025-05-16 VITALS
DIASTOLIC BLOOD PRESSURE: 86 MMHG | SYSTOLIC BLOOD PRESSURE: 149 MMHG | HEART RATE: 75 BPM | BODY MASS INDEX: 37.77 KG/M2 | HEIGHT: 66 IN | TEMPERATURE: 98.4 F | RESPIRATION RATE: 16 BRPM | OXYGEN SATURATION: 100 % | WEIGHT: 235 LBS

## 2025-05-16 DIAGNOSIS — L30.9 DERMATITIS: Primary | ICD-10-CM

## 2025-05-16 PROCEDURE — 99283 EMERGENCY DEPT VISIT LOW MDM: CPT

## 2025-05-16 RX ORDER — HYDROCORTISONE 25 MG/G
CREAM TOPICAL
Qty: 1 EACH | Refills: 0 | Status: SHIPPED | OUTPATIENT
Start: 2025-05-16

## 2025-05-16 RX ORDER — PREDNISONE 20 MG/1
40 TABLET ORAL DAILY
Qty: 10 TABLET | Refills: 0 | Status: SHIPPED | OUTPATIENT
Start: 2025-05-16 | End: 2025-05-21

## 2025-05-16 NOTE — ED PROVIDER NOTES
EMERGENCY DEPARTMENT HISTORY AND PHYSICAL EXAM      Date: 5/16/2025  Patient Name: Ophelia Au    History of Presenting Illness       History Provided By:     HPI: Ophelia Au, is a very pleasant 45 y.o. female presenting to the ED with a chief complaint of itchy rash.  States she was initially having issues with her scalp.  Subsequently saw dermatology and was diagnosed with seborrheic dermatitis she states.  Since this time the scalp problem has resolved however now she has itchy patches on her extremities and trunk.  No drainage.  No fevers.  No involvement of mucous membranes.  No blistering    Denies any other symptoms at this time.    PCP: Yohana Au, APRN - CNP    No current facility-administered medications on file prior to encounter.     Current Outpatient Medications on File Prior to Encounter   Medication Sig Dispense Refill    valACYclovir (VALTREX) 500 MG tablet TAKE 1 TABLET BY MOUTH EVERY DAY 30 tablet 3    Ciclopirox 1 % SHAM Apply 1 each topically every 7 days 480 mL 1    amLODIPine (NORVASC) 5 MG tablet Take 1 tablet by mouth daily      furosemide (LASIX) 20 MG tablet Take 1 tablet by mouth 2 times daily 180 tablet 1    medroxyPROGESTERone (PROVERA) 10 MG tablet Take 1 tablet by mouth daily 90 tablet 3    hydroCHLOROthiazide 12.5 MG capsule Take 1 capsule by mouth every morning (Patient not taking: Reported on 1/7/2025) 90 capsule 1    topiramate (TOPAMAX) 100 MG tablet Take 0.5 tablets by mouth 2 times daily      rizatriptan (MAXALT) 10 MG tablet Take 1 tablet by mouth once as needed for Migraine May repeat in 2 hours if needed. Do not take more than 4 tablets per week 30 tablet 0    potassium chloride (KLOR-CON M) 10 MEQ extended release tablet Take 1 tablet by mouth daily (Patient not taking: Reported on 1/7/2025) 90 tablet 1    acetaminophen (TYLENOL) 500 MG tablet Take 1 tablet by mouth every 6 hours as needed for Pain (Patient not taking: Reported on 9/16/2024) 20  1 tablet by mouth 2 times daily     hydroCHLOROthiazide 12.5 MG capsule  Take 1 capsule by mouth every morning     ketoconazole 2 % cream  Commonly known as: NIZORAL     medroxyPROGESTERone 10 MG tablet  Commonly known as: Provera  Take 1 tablet by mouth daily     potassium chloride 10 MEQ extended release tablet  Commonly known as: KLOR-CON M  Take 1 tablet by mouth daily     rizatriptan 10 MG tablet  Commonly known as: Maxalt  Take 1 tablet by mouth once as needed for Migraine May repeat in 2 hours if needed. Do not take more than 4 tablets per week     topiramate 100 MG tablet  Commonly known as: TOPAMAX     valACYclovir 500 MG tablet  Commonly known as: VALTREX  TAKE 1 TABLET BY MOUTH EVERY DAY               Where to Get Your Medications        These medications were sent to [facility]/pharmacy [address] - [address] [facility] - P [phone] - F [phone]  [address] [facility] [address]      Phone: [phone]   hydrocortisone 2.5 % cream  predniSONE 20 MG tablet         4. PATIENT REFERRED TO:  Yohana Au, APRN - CNP  [address] [facility] [address]  [facility] [address] [phone]    Schedule an appointment as soon as possible for a visit            Diagnosis     Clinical Impression:    1. Dermatitis        Attestations:    Andrew Goncalves DO    Please note that this dictation was completed with [employer], the computer voice recognition software.  Quite often unanticipated grammatical, syntax, homophones, and other interpretive errors are inadvertently transcribed by the computer software.  Please disregard these errors.  Please excuse any errors that have escaped final proofreading.  Thank you.         Andrew Goncalves DO  05/16/25 0928

## 2025-06-20 ENCOUNTER — TELEPHONE (OUTPATIENT)
Age: 45
End: 2025-06-20

## 2025-06-20 ENCOUNTER — OFFICE VISIT (OUTPATIENT)
Facility: CLINIC | Age: 45
End: 2025-06-20
Payer: COMMERCIAL

## 2025-06-20 VITALS
WEIGHT: 244 LBS | RESPIRATION RATE: 18 BRPM | HEART RATE: 79 BPM | OXYGEN SATURATION: 97 % | TEMPERATURE: 97.7 F | BODY MASS INDEX: 39.21 KG/M2 | DIASTOLIC BLOOD PRESSURE: 86 MMHG | HEIGHT: 66 IN | SYSTOLIC BLOOD PRESSURE: 139 MMHG

## 2025-06-20 DIAGNOSIS — B00.9 HSV INFECTION: ICD-10-CM

## 2025-06-20 DIAGNOSIS — I10 ESSENTIAL HYPERTENSION: Primary | ICD-10-CM

## 2025-06-20 PROCEDURE — 3079F DIAST BP 80-89 MM HG: CPT | Performed by: NURSE PRACTITIONER

## 2025-06-20 PROCEDURE — 3075F SYST BP GE 130 - 139MM HG: CPT | Performed by: NURSE PRACTITIONER

## 2025-06-20 PROCEDURE — 99213 OFFICE O/P EST LOW 20 MIN: CPT | Performed by: NURSE PRACTITIONER

## 2025-06-20 RX ORDER — VALACYCLOVIR HYDROCHLORIDE 500 MG/1
500 TABLET, FILM COATED ORAL DAILY
Qty: 30 TABLET | Refills: 0 | Status: SHIPPED | OUTPATIENT
Start: 2025-06-20

## 2025-06-20 RX ORDER — CLONIDINE HYDROCHLORIDE 0.1 MG/1
0.1 TABLET ORAL 2 TIMES DAILY
Qty: 60 TABLET | Refills: 3 | Status: SHIPPED | OUTPATIENT
Start: 2025-06-20

## 2025-06-20 SDOH — ECONOMIC STABILITY: FOOD INSECURITY: WITHIN THE PAST 12 MONTHS, THE FOOD YOU BOUGHT JUST DIDN'T LAST AND YOU DIDN'T HAVE MONEY TO GET MORE.: NEVER TRUE

## 2025-06-20 SDOH — ECONOMIC STABILITY: FOOD INSECURITY: WITHIN THE PAST 12 MONTHS, YOU WORRIED THAT YOUR FOOD WOULD RUN OUT BEFORE YOU GOT MONEY TO BUY MORE.: NEVER TRUE

## 2025-06-20 ASSESSMENT — PATIENT HEALTH QUESTIONNAIRE - PHQ9
SUM OF ALL RESPONSES TO PHQ QUESTIONS 1-9: 0
1. LITTLE INTEREST OR PLEASURE IN DOING THINGS: NOT AT ALL
2. FEELING DOWN, DEPRESSED OR HOPELESS: NOT AT ALL
SUM OF ALL RESPONSES TO PHQ QUESTIONS 1-9: 0

## 2025-06-20 NOTE — TELEPHONE ENCOUNTER
Patient called requesting a refill on Valtrex.  She has been scheduled for her annual exam on 07/11/25 with Dr Oconnor.  Per Dr Oconnor, refill submitted to the patient's pharmacy.

## 2025-06-20 NOTE — PROGRESS NOTES
Have you been to the ER, urgent care clinic since your last visit?  Hospitalized since your last visit?   NO    Have you seen or consulted any other health care providers outside our system since your last visit?   NO    Chief Complaint   Patient presents with    Hypertension     /86 (BP Site: Right Upper Arm, Patient Position: Sitting, BP Cuff Size: Large Adult)   Pulse 79   Temp 97.7 °F (36.5 °C) (Temporal)   Resp 18   Ht 1.676 m (5' 6\")   Wt 110.7 kg (244 lb)   SpO2 97%   BMI 39.38 kg/m²

## 2025-06-20 NOTE — PROGRESS NOTES
[facility]  [address] [place] [address] [address]   [place] [address]  (P) [phone]   (F) [phone]    Subjective:   Ophelia Au is a 45 y.o. female  established here with complaints of Hypertension   Reports a couple days a week BP gets very high 150/90, gets dizzy and headache, Tuesday and Wednesday, Thursday was better but is still has some headaches and residual dizziness.  Today has not been symptomatic.  Does continue to take amlodipine 5 mg, Lasix 20 mg, hydrochlorothiazide 12.5 mg.  No chest pains, shortness of breath.    Patient Active Problem List   Diagnosis    Severe obesity (HCC)    IIH (idiopathic intracranial hypertension)    Headache    HSV-1 infection    Alopecia of scalp    Migraine with aura    Essential hypertension    Hypokalemia      Current Outpatient Medications   Medication Sig Dispense Refill    cloNIDine (CATAPRES) 0.1 MG tablet Take 1 tablet by mouth 2 times daily as needed for blood pressure equal or greater than 140/90 60 tablet 3    valACYclovir (VALTREX) 500 MG tablet Take 1 tablet by mouth daily 30 tablet 0    hydrocortisone 2.5 % cream Apply topically 2 times daily x7 days. 1 each 0    Ciclopirox 1 % SHAM Apply 1 each topically every 7 days 480 mL 1    amLODIPine (NORVASC) 5 MG tablet Take 1 tablet by mouth daily      furosemide (LASIX) 20 MG tablet Take 1 tablet by mouth 2 times daily 180 tablet 1    medroxyPROGESTERone (PROVERA) 10 MG tablet Take 1 tablet by mouth daily 90 tablet 3    topiramate (TOPAMAX) 100 MG tablet Take 0.5 tablets by mouth 2 times daily      rizatriptan (MAXALT) 10 MG tablet Take 1 tablet by mouth once as needed for Migraine May repeat in 2 hours if needed. Do not take more than 4 tablets per week 30 tablet 0     No current facility-administered medications for this visit.      No Known Allergies   Past Medical History:   Diagnosis Date    Headache     HSV-1 infection     Hypertension     Migraines       Past Surgical

## 2025-06-21 ENCOUNTER — HOSPITAL ENCOUNTER (EMERGENCY)
Facility: HOSPITAL | Age: 45
Discharge: HOME | End: 2025-06-21
Payer: COMMERCIAL

## 2025-06-21 VITALS
HEART RATE: 82 BPM | WEIGHT: 244 LBS | SYSTOLIC BLOOD PRESSURE: 150 MMHG | HEIGHT: 66 IN | DIASTOLIC BLOOD PRESSURE: 98 MMHG | TEMPERATURE: 97 F | BODY MASS INDEX: 39.21 KG/M2 | OXYGEN SATURATION: 98 % | RESPIRATION RATE: 18 BRPM

## 2025-06-21 DIAGNOSIS — R21 RASH AND OTHER NONSPECIFIC SKIN ERUPTION: Primary | ICD-10-CM

## 2025-06-21 PROCEDURE — 99283 EMERGENCY DEPT VISIT LOW MDM: CPT

## 2025-06-21 RX ORDER — HYDROCORTISONE 25 MG/G
OINTMENT TOPICAL
Qty: 20 G | Refills: 0 | Status: SHIPPED | OUTPATIENT
Start: 2025-06-21 | End: 2025-06-28

## 2025-06-21 RX ORDER — PREDNISONE 20 MG/1
TABLET ORAL
Qty: 20 TABLET | Refills: 0 | Status: SHIPPED | OUTPATIENT
Start: 2025-06-21 | End: 2025-07-03

## 2025-06-21 ASSESSMENT — PAIN SCALES - GENERAL: PAINLEVEL_OUTOF10: 4

## 2025-06-21 ASSESSMENT — LIFESTYLE VARIABLES
HOW MANY STANDARD DRINKS CONTAINING ALCOHOL DO YOU HAVE ON A TYPICAL DAY: PATIENT DOES NOT DRINK
HOW OFTEN DO YOU HAVE A DRINK CONTAINING ALCOHOL: 2-4 TIMES A MONTH

## 2025-06-21 NOTE — ED TRIAGE NOTES
Pt states she has painful, dry skin spots on her hands, in between her breast, and ears.     Pt states she was seen before for this and given a steroid, which worked but she could not get an appointment with her dermatologist which is why she came back.

## 2025-06-28 ENCOUNTER — HOSPITAL ENCOUNTER (EMERGENCY)
Facility: HOSPITAL | Age: 45
Discharge: HOME | End: 2025-06-28
Attending: EMERGENCY MEDICINE
Payer: COMMERCIAL

## 2025-06-28 VITALS
SYSTOLIC BLOOD PRESSURE: 130 MMHG | HEART RATE: 76 BPM | WEIGHT: 244 LBS | HEIGHT: 66 IN | RESPIRATION RATE: 18 BRPM | OXYGEN SATURATION: 98 % | DIASTOLIC BLOOD PRESSURE: 74 MMHG | BODY MASS INDEX: 39.21 KG/M2 | TEMPERATURE: 98 F

## 2025-06-28 DIAGNOSIS — T19.2XXA VAGINAL FOREIGN BODY, INITIAL ENCOUNTER: Primary | ICD-10-CM

## 2025-06-28 PROCEDURE — 99282 EMERGENCY DEPT VISIT SF MDM: CPT

## 2025-06-28 RX ORDER — CALCIPOTRIENE, BETAMETHASONE DIPROPIONATE 50; .643 UG/G; MG/G
OINTMENT TOPICAL
COMMUNITY
Start: 2025-03-31

## 2025-06-28 ASSESSMENT — PAIN - FUNCTIONAL ASSESSMENT: PAIN_FUNCTIONAL_ASSESSMENT: NONE - DENIES PAIN

## 2025-06-28 ASSESSMENT — LIFESTYLE VARIABLES
HOW OFTEN DO YOU HAVE A DRINK CONTAINING ALCOHOL: MONTHLY OR LESS
HOW MANY STANDARD DRINKS CONTAINING ALCOHOL DO YOU HAVE ON A TYPICAL DAY: 1 OR 2

## 2025-06-28 NOTE — ED PROVIDER NOTES
[facility] EMERGENCY DEPT  EMERGENCY DEPARTMENT HISTORY AND PHYSICAL EXAM      Date: 2025  Patient Name: Ophelia Au  MRN: [id number]  Birthdate 1980  Date of evaluation: 2025  Provider: Shahnaz Cline MD  Note Started: 2:45 AM EDT 25    HISTORY OF PRESENT ILLNESS   No chief complaint on file.      History Provided By: Patient    HPI: Ophelia Au is a 45 y.o. female.  Patient presents with complaint of possible foreign body in vagina.  Patient thinks that she may have condom left in the vagina after a 6-year course she had just prior to arrival.  No bleeding.  No pelvic pain        PAST MEDICAL HISTORY   Past Medical History:  Past Medical History:   Diagnosis Date    Headache     HSV-1 infection     Hypertension     Migraines     Psoriasis     Uterine fibroid        Past Surgical History:  Past Surgical History:   Procedure Laterality Date    TUBAL LIGATION         Family History:  Family History   Problem Relation Age of Onset    Ovarian Cancer Sister     Migraines Sister     Ovarian Cancer Mother     Migraines Mother        Social History:  Social History     Tobacco Use    Smoking status: Former     Current packs/day: 0.00     Types: Cigarettes     Quit date: 2019     Years since quittin.4    Smokeless tobacco: Never   Vaping Use    Vaping status: Every Day    Substances: Nicotine   Substance Use Topics    Alcohol use: Yes    Drug use: Never       Allergies:  No Known Allergies    PCP: Yohana Au APRN - CNP    Current Meds:   No current facility-administered medications for this encounter.     Current Outpatient Medications   Medication Sig Dispense Refill    calcipotriene-betamethasone (TACLONEX) 0.005-0.064 % ointment 1 APPLICATION TO THE AFFECTED AREAS EXTERNALLY ONCE A DAY 30 DAYS      hydrocortisone 2.5 % ointment Apply topically 2 times daily. 20 g 0    predniSONE (DELTASONE) 20 MG tablet Take 3 tablets by mouth daily for 3 days, THEN 2 tablets daily for 3

## 2025-07-11 ENCOUNTER — OFFICE VISIT (OUTPATIENT)
Age: 45
End: 2025-07-11
Payer: COMMERCIAL

## 2025-07-11 VITALS
BODY MASS INDEX: 38.92 KG/M2 | HEIGHT: 66 IN | SYSTOLIC BLOOD PRESSURE: 127 MMHG | WEIGHT: 242.19 LBS | DIASTOLIC BLOOD PRESSURE: 85 MMHG

## 2025-07-11 DIAGNOSIS — Z12.31 ENCOUNTER FOR SCREENING MAMMOGRAM FOR MALIGNANT NEOPLASM OF BREAST: ICD-10-CM

## 2025-07-11 DIAGNOSIS — Z01.419 ENCOUNTER FOR ANNUAL ROUTINE GYNECOLOGICAL EXAMINATION: Primary | ICD-10-CM

## 2025-07-11 DIAGNOSIS — N92.0 MENORRHAGIA WITH REGULAR CYCLE: ICD-10-CM

## 2025-07-11 DIAGNOSIS — T19.2XXA FOREIGN BODY IN VAGINA, INITIAL ENCOUNTER: ICD-10-CM

## 2025-07-11 PROCEDURE — 3074F SYST BP LT 130 MM HG: CPT | Performed by: OBSTETRICS & GYNECOLOGY

## 2025-07-11 PROCEDURE — 3079F DIAST BP 80-89 MM HG: CPT | Performed by: OBSTETRICS & GYNECOLOGY

## 2025-07-11 PROCEDURE — 99396 PREV VISIT EST AGE 40-64: CPT | Performed by: OBSTETRICS & GYNECOLOGY

## 2025-07-11 NOTE — PROGRESS NOTES
Ophelia is a 45 y.o. female who presents today for the following:    Chief Complaint   Patient presents with    Annual Exam        No Known Allergies       Current Outpatient Medications:     calcipotriene-betamethasone (TACLONEX) 0.005-0.064 % ointment, 1 APPLICATION TO THE AFFECTED AREAS EXTERNALLY ONCE A DAY 30 DAYS, Disp: , Rfl:     valACYclovir (VALTREX) 500 MG tablet, Take 1 tablet by mouth daily, Disp: 30 tablet, Rfl: 0    cloNIDine (CATAPRES) 0.1 MG tablet, Take 1 tablet by mouth 2 times daily as needed for blood pressure equal or greater than 140/90, Disp: 60 tablet, Rfl: 3    Ciclopirox 1 % SHAM, Apply 1 each topically every 7 days, Disp: 480 mL, Rfl: 1    amLODIPine (NORVASC) 5 MG tablet, Take 1 tablet by mouth daily, Disp: , Rfl:     furosemide (LASIX) 20 MG tablet, Take 1 tablet by mouth 2 times daily, Disp: 180 tablet, Rfl: 1    medroxyPROGESTERone (PROVERA) 10 MG tablet, Take 1 tablet by mouth daily, Disp: 90 tablet, Rfl: 3    topiramate (TOPAMAX) 100 MG tablet, Take 0.5 tablets by mouth 2 times daily, Disp: , Rfl:     rizatriptan (MAXALT) 10 MG tablet, Take 1 tablet by mouth once as needed for Migraine May repeat in 2 hours if needed. Do not take more than 4 tablets per week, Disp: 30 tablet, Rfl: 0     Past Medical History:   Diagnosis Date    Headache     HSV-1 infection     Hypertension     Migraines     Psoriasis     Uterine fibroid         Past Surgical History:   Procedure Laterality Date    TUBAL LIGATION          Family History   Problem Relation Age of Onset    Ovarian Cancer Sister     Migraines Sister     Ovarian Cancer Mother     Migraines Mother         Social History     Socioeconomic History    Marital status:      Spouse name: None    Number of children: None    Years of education: None    Highest education level: None   Tobacco Use    Smoking status: Former     Current packs/day: 0.00     Types: Cigarettes     Quit date: 2019     Years since quittin.5    Smokeless

## 2025-07-13 LAB
A VAGINAE DNA VAG QL NAA+PROBE: NORMAL SCORE
BVAB2 DNA VAG QL NAA+PROBE: NORMAL SCORE
C ALBICANS DNA VAG QL NAA+PROBE: NEGATIVE
C GLABRATA DNA VAG QL NAA+PROBE: NEGATIVE
C TRACH DNA SPEC QL NAA+PROBE: NEGATIVE
MEGA1 DNA VAG QL NAA+PROBE: NORMAL SCORE
N GONORRHOEA DNA VAG QL NAA+PROBE: NEGATIVE
T VAGINALIS DNA VAG QL NAA+PROBE: NEGATIVE

## 2025-07-15 ENCOUNTER — OFFICE VISIT (OUTPATIENT)
Age: 45
End: 2025-07-15
Payer: COMMERCIAL

## 2025-07-15 VITALS
HEART RATE: 74 BPM | TEMPERATURE: 97.5 F | RESPIRATION RATE: 20 BRPM | WEIGHT: 245.8 LBS | HEIGHT: 66 IN | SYSTOLIC BLOOD PRESSURE: 132 MMHG | BODY MASS INDEX: 39.5 KG/M2 | DIASTOLIC BLOOD PRESSURE: 85 MMHG | OXYGEN SATURATION: 96 %

## 2025-07-15 DIAGNOSIS — G93.2 IIH (IDIOPATHIC INTRACRANIAL HYPERTENSION): Primary | ICD-10-CM

## 2025-07-15 DIAGNOSIS — E78.00 PURE HYPERCHOLESTEROLEMIA: ICD-10-CM

## 2025-07-15 DIAGNOSIS — I10 ESSENTIAL HYPERTENSION: ICD-10-CM

## 2025-07-15 DIAGNOSIS — Z13.1 DIABETES MELLITUS SCREENING: ICD-10-CM

## 2025-07-15 DIAGNOSIS — L40.9 PSORIASIS: ICD-10-CM

## 2025-07-15 DIAGNOSIS — G43.709 CHRONIC MIGRAINE WITHOUT AURA WITHOUT STATUS MIGRAINOSUS, NOT INTRACTABLE: ICD-10-CM

## 2025-07-15 PROBLEM — R51.9 HEADACHE: Status: RESOLVED | Noted: 2023-04-12 | Resolved: 2025-07-15

## 2025-07-15 PROCEDURE — 99215 OFFICE O/P EST HI 40 MIN: CPT | Performed by: STUDENT IN AN ORGANIZED HEALTH CARE EDUCATION/TRAINING PROGRAM

## 2025-07-15 PROCEDURE — 3079F DIAST BP 80-89 MM HG: CPT | Performed by: STUDENT IN AN ORGANIZED HEALTH CARE EDUCATION/TRAINING PROGRAM

## 2025-07-15 PROCEDURE — 3075F SYST BP GE 130 - 139MM HG: CPT | Performed by: STUDENT IN AN ORGANIZED HEALTH CARE EDUCATION/TRAINING PROGRAM

## 2025-07-15 RX ORDER — FUROSEMIDE 20 MG/1
20 TABLET ORAL 2 TIMES DAILY
Qty: 180 TABLET | Refills: 1 | Status: CANCELLED | OUTPATIENT
Start: 2025-07-15

## 2025-07-15 RX ORDER — ACETAZOLAMIDE 250 MG/1
250 TABLET ORAL 2 TIMES DAILY
Qty: 90 TABLET | Refills: 3 | Status: SHIPPED | OUTPATIENT
Start: 2025-07-15

## 2025-07-15 RX ORDER — CLOBETASOL PROPIONATE 0.5 MG/G
OINTMENT TOPICAL
Qty: 45 G | Refills: 3 | Status: SHIPPED | OUTPATIENT
Start: 2025-07-15

## 2025-07-15 RX ORDER — VALSARTAN 160 MG/1
160 TABLET ORAL DAILY
Qty: 90 TABLET | Refills: 1 | Status: SHIPPED | OUTPATIENT
Start: 2025-07-15

## 2025-07-15 NOTE — ASSESSMENT & PLAN NOTE
Previously on high dose of acetazolamide but has not been on this for some time. Resume at a lower dose of 250 mg BID. She does have a neurologist but says she has not been able to get an appointment and for continuity she is interested in transitioning to neuro with [facility].

## 2025-07-15 NOTE — PATIENT INSTRUCTIONS
Start taking valsartan in addition to your amlodipine, after about 1 week, stop the amlodipine    Call to schedule appointments with neurology and sleep medicine

## 2025-07-15 NOTE — PROGRESS NOTES
Ophelia Au is a 45 y.o. year old female who is a new patient to me today. She was previously followed by NP Yohana Au.        Assessment & Plan:   1. IIH (idiopathic intracranial hypertension)  Assessment & Plan:  Previously on high dose of acetazolamide but has not been on this for some time. Resume at a lower dose of 250 mg BID. She does have a neurologist but says she has not been able to get an appointment and for continuity she is interested in transitioning to neuro with [facility].  Orders:  -     acetaZOLAMIDE (DIAMOX) 250 MG tablet; Take 1 tablet by mouth 2 times daily, Disp-90 tablet, R-3Normal  -     [facility] - Harrison Souza MD, Neurology, [place]  2. Essential hypertension  Assessment & Plan:  At goal but having having swelling due to amlodipine. Rather than treating this side effect with Lasix, will change amlodipine to valsartan. Continue clonidine for now, but will titrate valsartan to keep BP controlled without need for PRN medication.  Orders:  -     valsartan (DIOVAN) 160 MG tablet; Take 1 tablet by mouth daily, Disp-90 tablet, R-1Normal  -     Comprehensive Metabolic Panel; Future  -     CBC; Future  3. Chronic migraine without aura without status migrainosus, not intractable  Assessment & Plan:  Continue Topamax and rizatriptan PRN. Referred to neurology. With AM headaches would also recommend sleep study, referred to Dr Brooks.Completed [facility] paperwork today.  Orders:  -     [facility] - Michelle Brooks MD, Sleep Medicine, [place] [address] [address]  4. Psoriasis  -     clobetasol (TEMOVATE) 0.05 % ointment; Apply topically 2 times daily., Disp-45 g, R-3, Normal  5. Pure hypercholesterolemia  -     Lipid Panel; Future  6. Diabetes mellitus screening  -     Hemoglobin A1C; Future        Return in about 6 weeks (around 8/26/2025) for BP Followup.    History/Subjective   Chief Complaint: Establish Care      History of Present Illness  The patient presents to establish care.    Elevated Blood Pressure  She has

## 2025-07-15 NOTE — ASSESSMENT & PLAN NOTE
At goal but having having swelling due to amlodipine. Rather than treating this side effect with Lasix, will change amlodipine to valsartan. Continue clonidine for now, but will titrate valsartan to keep BP controlled without need for PRN medication.

## 2025-07-15 NOTE — ASSESSMENT & PLAN NOTE
Continue Topamax and rizatriptan PRN. Referred to neurology. With AM headaches would also recommend sleep study, referred to Dr Brooks.

## 2025-07-16 LAB
ALBUMIN SERPL-MCNC: 3.8 G/DL (ref 3.5–5)
ALBUMIN/GLOB SERPL: 1.1 (ref 1.1–2.2)
ALP SERPL-CCNC: 104 U/L (ref 45–117)
ALT SERPL-CCNC: 24 U/L (ref 12–78)
ANION GAP SERPL CALC-SCNC: 5 MMOL/L (ref 2–12)
AST SERPL-CCNC: 10 U/L (ref 15–37)
BILIRUB SERPL-MCNC: 0.3 MG/DL (ref 0.2–1)
BUN SERPL-MCNC: 8 MG/DL (ref 6–20)
BUN/CREAT SERPL: 11 (ref 12–20)
CALCIUM SERPL-MCNC: 9.2 MG/DL (ref 8.5–10.1)
CHLORIDE SERPL-SCNC: 106 MMOL/L (ref 97–108)
CHOLEST SERPL-MCNC: 219 MG/DL
CO2 SERPL-SCNC: 30 MMOL/L (ref 21–32)
CREAT SERPL-MCNC: 0.75 MG/DL (ref 0.55–1.02)
ERYTHROCYTE [DISTWIDTH] IN BLOOD BY AUTOMATED COUNT: 13.3 % (ref 11.5–14.5)
EST. AVERAGE GLUCOSE BLD GHB EST-MCNC: 100 MG/DL
GLOBULIN SER CALC-MCNC: 3.6 G/DL (ref 2–4)
GLUCOSE SERPL-MCNC: 95 MG/DL (ref 65–100)
HBA1C MFR BLD: 5.1 % (ref 4–5.6)
HCT VFR BLD AUTO: 39.5 % (ref 35–47)
HDLC SERPL-MCNC: 70 MG/DL
HDLC SERPL: 3.1 (ref 0–5)
HGB BLD-MCNC: 12.2 G/DL (ref 11.5–16)
LDLC SERPL CALC-MCNC: 118.2 MG/DL (ref 0–100)
MCH RBC QN AUTO: 30.6 PG (ref 26–34)
MCHC RBC AUTO-ENTMCNC: 30.9 G/DL (ref 30–36.5)
MCV RBC AUTO: 99 FL (ref 80–99)
NRBC # BLD: 0 K/UL (ref 0–0.01)
NRBC BLD-RTO: 0 PER 100 WBC
PLATELET # BLD AUTO: 262 K/UL (ref 150–400)
PMV BLD AUTO: 10.6 FL (ref 8.9–12.9)
POTASSIUM SERPL-SCNC: 3.2 MMOL/L (ref 3.5–5.1)
PROT SERPL-MCNC: 7.4 G/DL (ref 6.4–8.2)
RBC # BLD AUTO: 3.99 M/UL (ref 3.8–5.2)
SODIUM SERPL-SCNC: 141 MMOL/L (ref 136–145)
TRIGL SERPL-MCNC: 154 MG/DL
VLDLC SERPL CALC-MCNC: 30.8 MG/DL
WBC # BLD AUTO: 5.5 K/UL (ref 3.6–11)

## 2025-07-17 DIAGNOSIS — B00.9 HSV INFECTION: ICD-10-CM

## 2025-07-17 RX ORDER — VALACYCLOVIR HYDROCHLORIDE 500 MG/1
500 TABLET, FILM COATED ORAL DAILY
Qty: 30 TABLET | Refills: 11 | Status: SHIPPED | OUTPATIENT
Start: 2025-07-17

## 2025-07-18 ENCOUNTER — TELEPHONE (OUTPATIENT)
Age: 45
End: 2025-07-18

## 2025-07-18 NOTE — TELEPHONE ENCOUNTER
Joshua Oconnell, DO  You1 hour ago (12:49 PM)       Please have her start Aleve 2 tabs twice a day. If worsening or not better after a week let me know.

## 2025-07-18 NOTE — TELEPHONE ENCOUNTER
Reason for call:  TC from patient. Pt stated that at her appt on 7/15 she was told if she was still having pain in her left leg to let her now. Pt is still having pain wants to know if  can prescribe her a pain medication?    Is this a new problem: No    Date of last appointment:  7/15/2025     Can we respond via [employer]: No    Best call back number:      Ophelia Au (Self) [phone] (Mobile)

## 2025-07-23 ENCOUNTER — TELEPHONE (OUTPATIENT)
Age: 45
End: 2025-07-23

## 2025-07-23 NOTE — TELEPHONE ENCOUNTER
Patient called and would like to know if her [facility] paperwork has been corrected and refaxed.    Ophelia Au - [phone]

## 2025-07-24 NOTE — TELEPHONE ENCOUNTER
Called patient to advise her that for was updated and [email or web address]. She requested that a copy be sent to her on my chart. Will scan and send document to patient

## 2025-08-07 ENCOUNTER — TELEPHONE (OUTPATIENT)
Age: 45
End: 2025-08-07

## 2025-08-08 DIAGNOSIS — N89.8 VAGINAL ODOR: Primary | ICD-10-CM

## 2025-08-08 RX ORDER — FLUCONAZOLE 150 MG/1
150 TABLET ORAL ONCE
Qty: 1 TABLET | Refills: 0 | Status: SHIPPED | OUTPATIENT
Start: 2025-08-08 | End: 2025-08-08

## 2025-08-08 RX ORDER — CLINDAMYCIN HYDROCHLORIDE 300 MG/1
300 CAPSULE ORAL 2 TIMES DAILY
Qty: 14 CAPSULE | Refills: 0 | Status: SHIPPED | OUTPATIENT
Start: 2025-08-08 | End: 2025-08-15